# Patient Record
Sex: FEMALE | Race: BLACK OR AFRICAN AMERICAN | NOT HISPANIC OR LATINO | Employment: FULL TIME | URBAN - METROPOLITAN AREA
[De-identification: names, ages, dates, MRNs, and addresses within clinical notes are randomized per-mention and may not be internally consistent; named-entity substitution may affect disease eponyms.]

---

## 2018-06-28 ENCOUNTER — TRANSCRIBE ORDERS (OUTPATIENT)
Dept: ADMINISTRATIVE | Facility: HOSPITAL | Age: 48
End: 2018-06-28

## 2018-06-28 DIAGNOSIS — D50.9 IRON DEFICIENCY ANEMIA, UNSPECIFIED IRON DEFICIENCY ANEMIA TYPE: Primary | ICD-10-CM

## 2018-06-28 DIAGNOSIS — D64.9 LOW HEMOGLOBIN: ICD-10-CM

## 2018-06-29 PROBLEM — D50.9 IRON DEFICIENCY ANEMIA: Status: ACTIVE | Noted: 2018-06-29

## 2018-07-02 ENCOUNTER — HOSPITAL ENCOUNTER (OUTPATIENT)
Dept: INFUSION THERAPY | Facility: HOSPITAL | Age: 48
Discharge: HOME | End: 2018-07-02
Admitting: PHYSICIAN ASSISTANT

## 2018-07-02 VITALS
SYSTOLIC BLOOD PRESSURE: 131 MMHG | DIASTOLIC BLOOD PRESSURE: 81 MMHG | HEART RATE: 76 BPM | WEIGHT: 203 LBS | TEMPERATURE: 98 F | BODY MASS INDEX: 32.62 KG/M2 | OXYGEN SATURATION: 100 % | HEIGHT: 66 IN | RESPIRATION RATE: 16 BRPM

## 2018-07-02 DIAGNOSIS — D50.9 IRON DEFICIENCY ANEMIA, UNSPECIFIED IRON DEFICIENCY ANEMIA TYPE: ICD-10-CM

## 2018-07-02 DIAGNOSIS — D50.8 OTHER IRON DEFICIENCY ANEMIA: Primary | ICD-10-CM

## 2018-07-02 LAB
ABO GROUP BLD: NORMAL
BLD GP AB SCN SERPL QL: NEGATIVE
RH BLD: POSITIVE
T&S EXPIRATION DATE: NORMAL

## 2018-07-02 PROCEDURE — 36415 COLL VENOUS BLD VENIPUNCTURE: CPT

## 2018-07-02 PROCEDURE — P9016 RBC LEUKOCYTES REDUCED: HCPCS

## 2018-07-02 PROCEDURE — 25010000002 FUROSEMIDE PER 20 MG: Performed by: PHYSICIAN ASSISTANT

## 2018-07-02 PROCEDURE — 96374 THER/PROPH/DIAG INJ IV PUSH: CPT

## 2018-07-02 PROCEDURE — 86900 BLOOD TYPING SEROLOGIC ABO: CPT | Performed by: PHYSICIAN ASSISTANT

## 2018-07-02 PROCEDURE — 86901 BLOOD TYPING SEROLOGIC RH(D): CPT | Performed by: PHYSICIAN ASSISTANT

## 2018-07-02 PROCEDURE — 86920 COMPATIBILITY TEST SPIN: CPT

## 2018-07-02 PROCEDURE — 86850 RBC ANTIBODY SCREEN: CPT | Performed by: PHYSICIAN ASSISTANT

## 2018-07-02 PROCEDURE — 36430 TRANSFUSION BLD/BLD COMPNT: CPT

## 2018-07-02 PROCEDURE — 86900 BLOOD TYPING SEROLOGIC ABO: CPT

## 2018-07-02 RX ORDER — FUROSEMIDE 10 MG/ML
20 INJECTION INTRAMUSCULAR; INTRAVENOUS ONCE
Status: CANCELLED
Start: 2018-07-02 | End: 2018-07-02

## 2018-07-02 RX ORDER — FUROSEMIDE 10 MG/ML
20 INJECTION INTRAMUSCULAR; INTRAVENOUS ONCE
Status: COMPLETED | OUTPATIENT
Start: 2018-07-02 | End: 2018-07-02

## 2018-07-02 RX ORDER — TRIAMTERENE AND HYDROCHLOROTHIAZIDE 37.5; 25 MG/1; MG/1
1 CAPSULE ORAL EVERY MORNING
COMMUNITY

## 2018-07-02 RX ORDER — DIPHENHYDRAMINE HCL 25 MG
25 CAPSULE ORAL ONCE AS NEEDED
Status: DISCONTINUED | OUTPATIENT
Start: 2018-07-02 | End: 2018-07-04 | Stop reason: HOSPADM

## 2018-07-02 RX ADMIN — FUROSEMIDE 20 MG: 10 INJECTION, SOLUTION INTRAMUSCULAR; INTRAVENOUS at 12:29

## 2018-07-02 NOTE — PROGRESS NOTES
Tolerated transfusion without difficulty. Up to bathroom prn. Peanut butter crackers and pepsi given. Mother came to visit and brought pt [employer]. Warm blanket given. Rested quietly, watching TV or talking on phone. D/C'd per ambulation @ 1518.

## 2018-07-02 NOTE — PATIENT INSTRUCTIONS
Blood Transfusion, Adult  A blood transfusion is a procedure in which you receive donated blood, including plasma, platelets, and red blood cells, through an IV tube. You may need a blood transfusion because of illness, surgery, or injury. The blood may come from a donor. You may also be able to donate blood for yourself (autologous blood donation) before a surgery if you know that you might require a blood transfusion.  The blood given in a transfusion is made up of different types of cells. You may receive:  · Red blood cells. These carry oxygen to the cells in the body.  · White blood cells. These help you fight infections.  · Platelets. These help your blood to clot.  · Plasma. This is the liquid part of your blood and it helps with fluid imbalances.    If you have hemophilia or another clotting disorder, you may also receive other types of blood products.  Tell a health care provider about:  · Any allergies you have.  · All medicines you are taking, including vitamins, herbs, eye drops, creams, and over-the-counter medicines.  · Any problems you or family members have had with anesthetic medicines.  · Any blood disorders you have.  · Any surgeries you have had.  · Any medical conditions you have, including any recent fever or cold symptoms.  · Whether you are pregnant or may be pregnant.  · Any previous reactions you have had during a blood transfusion.  What are the risks?  Generally, this is a safe procedure. However, problems may occur, including:  · Having an allergic reaction to something in the donated blood. Hives and itching may be symptoms of this type of reaction.  · Fever. This may be a reaction to the white blood cells in the transfused blood. Nausea or chest pain may accompany a fever.  · Iron overload. This can happen from having many transfusions.  · Transfusion-related acute lung injury (TRALI). This is a rare reaction that causes lung damage. The cause is not known. TRALI can occur within hours  of a transfusion or several days later.  · Sudden (acute) or delayed hemolytic reactions. This happens if your blood does not match the cells in your transfusion. Your body’s defense system (immune system) may try to attack the new cells. This complication is rare. The symptoms include fever, chills, nausea, and low back pain or chest pain.  · Infection or disease transmission. This is rare.    What happens before the procedure?  · You will have a blood test to determine your blood type. This is necessary to know what kind of blood your body will accept and to match it to the donor blood.  · If you are going to have a planned surgery, you may be able to do an autologous blood donation. This may be done in case you need to have a transfusion.  · If you have had an allergic reaction to a transfusion in the past, you may be given medicine to help prevent a reaction. This medicine may be given to you by mouth or through an IV tube.  · You will have your temperature, blood pressure, and pulse monitored before the transfusion.  · Follow instructions from your health care provider about eating and drinking restrictions.  · Ask your health care provider about:  ? Changing or stopping your regular medicines. This is especially important if you are taking diabetes medicines or blood thinners.  ? Taking medicines such as aspirin and ibuprofen. These medicines can thin your blood. Do not take these medicines before your procedure if your health care provider instructs you not to.  What happens during the procedure?  · An IV tube will be inserted into one of your veins.  · The bag of donated blood will be attached to your IV tube. The blood will then enter through your vein.  · Your temperature, blood pressure, and pulse will be monitored regularly during the transfusion. This monitoring is done to detect early signs of a transfusion reaction.  · If you have any signs or symptoms of a reaction, your transfusion will be stopped  and you may be given medicine.  · When the transfusion is complete, your IV tube will be removed.  · Pressure may be applied to the IV site for a few minutes.  · A bandage (dressing) will be applied.  The procedure may vary among health care providers and hospitals.  What happens after the procedure?  · Your temperature, blood pressure, heart rate, breathing rate, and blood oxygen level will be monitored often.  · Your blood may be tested to see how you are responding to the transfusion.  · You may be warmed with fluids or blankets to maintain a normal body temperature.  Summary  · A blood transfusion is a procedure in which you receive donated blood, including plasma, platelets, and red blood cells, through an IV tube.  · Your temperature, blood pressure, and pulse will be monitored before, during, and after the transfusion.  · Your blood may be tested after the transfusion to see how your body has responded.  This information is not intended to replace advice given to you by your health care provider. Make sure you discuss any questions you have with your health care provider.  Document Released: 12/15/2001 Document Revised: 09/14/2017 Document Reviewed: 09/14/2017  [employer] Interactive Patient Education © 2018 [employer]

## 2018-07-03 LAB
ABO + RH BLD: NORMAL
ABO + RH BLD: NORMAL
BH BB BLOOD EXPIRATION DATE: NORMAL
BH BB BLOOD EXPIRATION DATE: NORMAL
BH BB BLOOD TYPE BARCODE: 5100
BH BB BLOOD TYPE BARCODE: 5100
BH BB DISPENSE STATUS: NORMAL
BH BB DISPENSE STATUS: NORMAL
BH BB PRODUCT CODE: NORMAL
BH BB PRODUCT CODE: NORMAL
BH BB UNIT NUMBER: NORMAL
BH BB UNIT NUMBER: NORMAL
CROSSMATCH INTERPRETATION: NORMAL
CROSSMATCH INTERPRETATION: NORMAL
UNIT  ABO: NORMAL
UNIT  ABO: NORMAL
UNIT  RH: NORMAL
UNIT  RH: NORMAL

## 2018-07-09 ENCOUNTER — OFFICE VISIT (OUTPATIENT)
Dept: GASTROENTEROLOGY | Facility: CLINIC | Age: 48
End: 2018-07-09

## 2018-07-09 VITALS
HEIGHT: 66 IN | DIASTOLIC BLOOD PRESSURE: 82 MMHG | TEMPERATURE: 98.6 F | BODY MASS INDEX: 32.3 KG/M2 | SYSTOLIC BLOOD PRESSURE: 122 MMHG | WEIGHT: 201 LBS

## 2018-07-09 DIAGNOSIS — D50.9 IRON DEFICIENCY ANEMIA, UNSPECIFIED IRON DEFICIENCY ANEMIA TYPE: Primary | ICD-10-CM

## 2018-07-09 PROCEDURE — 99204 OFFICE O/P NEW MOD 45 MIN: CPT | Performed by: INTERNAL MEDICINE

## 2018-07-09 RX ORDER — SODIUM CHLORIDE, SODIUM LACTATE, POTASSIUM CHLORIDE, CALCIUM CHLORIDE 600; 310; 30; 20 MG/100ML; MG/100ML; MG/100ML; MG/100ML
30 INJECTION, SOLUTION INTRAVENOUS CONTINUOUS
Status: CANCELLED | OUTPATIENT
Start: 2018-07-24

## 2018-07-09 NOTE — PROGRESS NOTES
Chief Complaint   Patient presents with   • Anemia     Hemoglobin: 7.0     Subjective      HPI     Estella Montez is a 47 y.o. female who presents for evaluation of anemia.  Chronic issue, dating back to at least 2012. Recent iron indices by PCP shows low iron and %saturation.  She has required transfusion of PRBCs as recently as 1 week ago.  No prior GI evaluation.  No obvious overt Gi bleeding.  Really quite asymptomatic.  Has tried oral iron in past but has been intolerant.  Normal menstrual periods.      Past Medical History:   Diagnosis Date   • Anemia        Current Outpatient Prescriptions:   •  Multiple Vitamins-Minerals (HAIR SKIN AND NAILS FORMULA PO), Take 1 tablet by mouth 2 (Two) Times a Day., Disp: , Rfl:   •  triamterene-hydrochlorothiazide (DYAZIDE) 37.5-25 MG per capsule, Take 1 capsule by mouth Every Morning., Disp: , Rfl:   No Known Allergies  Social History     Social History   • Marital status:      Spouse name: N/A   • Number of children: N/A   • Years of education: N/A     Occupational History   • Not on file.     Social History Main Topics   • Smoking status: Never Smoker   • Smokeless tobacco: Never Used   • Alcohol use Yes      Comment: occasional   • Drug use: No   • Sexual activity: Not on file     Other Topics Concern   • Not on file     Social History Narrative   • No narrative on file     History reviewed. No pertinent family history.  Review of Systems   Constitutional: Negative.    Gastrointestinal: Negative.    All other systems reviewed and are negative.    Objective   Vitals:    07/09/18 1354   BP: 122/82   Temp: 98.6 °F (37 °C)     Physical Exam   Constitutional: She is oriented to person, place, and time. She appears well-developed and well-nourished.   HENT:   Head: Normocephalic and atraumatic.   Mouth/Throat: Oropharynx is clear and moist.   Eyes: EOM are normal. No scleral icterus.   Neck: Normal range of motion. Neck supple. No thyromegaly present.    Cardiovascular: Normal rate, regular rhythm and normal heart sounds.  Exam reveals no gallop and no friction rub.    No murmur heard.  Pulmonary/Chest: Effort normal and breath sounds normal. She has no wheezes. She has no rales. She exhibits no tenderness.   Abdominal: Soft. Bowel sounds are normal. She exhibits no distension. There is no tenderness. There is no rebound and no guarding. No hernia.   obese   Musculoskeletal: Normal range of motion. She exhibits no edema.   Lymphadenopathy:     She has no cervical adenopathy.   Neurological: She is alert and oriented to person, place, and time.   Skin: Skin is warm and dry.   Psychiatric: She has a normal mood and affect. Judgment and thought content normal.   Vitals reviewed.    Assessment/Plan   Assessment:     1. Iron deficiency anemia, unspecified iron deficiency anemia type      Plan:   Patient certainly warrants endoscopic evaluation for her chronic iron deficiency anemia.  I suspected at the end of the day, however, this is not related to GI blood loss given the chronicity of her issue and lack of GI symptosm.  I am going to go ahead and initiate hematology referral to have this worked up from their perspective and also to consider her for IV iron transfusions given her intolerance to oral iron.        Homar Weeks M.D.  [facility]  [address] [place] [address]  Office: [phone]

## 2018-07-10 ENCOUNTER — CONSULT (OUTPATIENT)
Dept: ONCOLOGY | Facility: CLINIC | Age: 48
End: 2018-07-10

## 2018-07-10 ENCOUNTER — LAB (OUTPATIENT)
Dept: LAB | Facility: HOSPITAL | Age: 48
End: 2018-07-10

## 2018-07-10 VITALS
RESPIRATION RATE: 12 BRPM | SYSTOLIC BLOOD PRESSURE: 116 MMHG | TEMPERATURE: 99 F | DIASTOLIC BLOOD PRESSURE: 82 MMHG | WEIGHT: 201.2 LBS | OXYGEN SATURATION: 100 % | HEART RATE: 78 BPM | BODY MASS INDEX: 32.33 KG/M2 | HEIGHT: 66 IN

## 2018-07-10 DIAGNOSIS — IMO0001 ADVERSE EFFECT OF IRON OR ITS COMPOUND, SEQUELA: ICD-10-CM

## 2018-07-10 DIAGNOSIS — D50.9 IRON DEFICIENCY ANEMIA, UNSPECIFIED IRON DEFICIENCY ANEMIA TYPE: Primary | ICD-10-CM

## 2018-07-10 DIAGNOSIS — D50.8 OTHER IRON DEFICIENCY ANEMIA: Primary | ICD-10-CM

## 2018-07-10 LAB
BASOPHILS # BLD AUTO: 0.06 10*3/MM3 (ref 0–0.1)
BASOPHILS NFR BLD AUTO: 0.9 % (ref 0–1.1)
DEPRECATED RDW RBC AUTO: 52.8 FL (ref 37–49)
EOSINOPHIL # BLD AUTO: 0.31 10*3/MM3 (ref 0–0.36)
EOSINOPHIL NFR BLD AUTO: 4.6 % (ref 1–5)
ERYTHROCYTE [DISTWIDTH] IN BLOOD BY AUTOMATED COUNT: 23.7 % (ref 11.7–14.5)
HCT VFR BLD AUTO: 33.2 % (ref 34–45)
HGB BLD-MCNC: 9.8 G/DL (ref 11.5–14.9)
IMM GRANULOCYTES # BLD: 0.02 10*3/MM3 (ref 0–0.03)
IMM GRANULOCYTES NFR BLD: 0.3 % (ref 0–0.5)
LYMPHOCYTES # BLD AUTO: 2.31 10*3/MM3 (ref 1–3.5)
LYMPHOCYTES NFR BLD AUTO: 34.1 % (ref 20–49)
MCH RBC QN AUTO: 19.2 PG (ref 27–33)
MCHC RBC AUTO-ENTMCNC: 29.5 G/DL (ref 32–35)
MCV RBC AUTO: 65.1 FL (ref 83–97)
MONOCYTES # BLD AUTO: 0.55 10*3/MM3 (ref 0.25–0.8)
MONOCYTES NFR BLD AUTO: 8.1 % (ref 4–12)
NEUTROPHILS # BLD AUTO: 3.53 10*3/MM3 (ref 1.5–7)
NEUTROPHILS NFR BLD AUTO: 52 % (ref 39–75)
NRBC BLD MANUAL-RTO: 0 /100 WBC (ref 0–0)
PLATELET # BLD AUTO: 338 10*3/MM3 (ref 150–375)
PMV BLD AUTO: 8.9 FL (ref 8.9–12.1)
RBC # BLD AUTO: 5.1 10*6/MM3 (ref 3.9–5)
WBC NRBC COR # BLD: 6.78 10*3/MM3 (ref 4–10)

## 2018-07-10 PROCEDURE — 99244 OFF/OP CNSLTJ NEW/EST MOD 40: CPT | Performed by: INTERNAL MEDICINE

## 2018-07-10 RX ORDER — DIPHENHYDRAMINE HCL 25 MG
25 CAPSULE ORAL ONCE
Status: CANCELLED | OUTPATIENT
Start: 2018-07-13

## 2018-07-10 RX ORDER — SODIUM CHLORIDE 9 MG/ML
250 INJECTION, SOLUTION INTRAVENOUS ONCE
Status: CANCELLED | OUTPATIENT
Start: 2018-07-13

## 2018-07-10 RX ORDER — ACETAMINOPHEN 325 MG/1
650 TABLET ORAL ONCE
Status: CANCELLED | OUTPATIENT
Start: 2018-07-13

## 2018-07-10 RX ORDER — DIPHENHYDRAMINE HYDROCHLORIDE 50 MG/ML
25 INJECTION INTRAMUSCULAR; INTRAVENOUS ONCE
Status: CANCELLED | OUTPATIENT
Start: 2018-07-13

## 2018-07-10 RX ORDER — FAMOTIDINE 20 MG/1
20 TABLET, FILM COATED ORAL ONCE
Status: CANCELLED | OUTPATIENT
Start: 2018-07-13

## 2018-07-13 ENCOUNTER — INFUSION (OUTPATIENT)
Dept: ONCOLOGY | Facility: HOSPITAL | Age: 48
End: 2018-07-13

## 2018-07-13 VITALS
WEIGHT: 203.3 LBS | BODY MASS INDEX: 33.07 KG/M2 | TEMPERATURE: 98.6 F | HEART RATE: 89 BPM | SYSTOLIC BLOOD PRESSURE: 123 MMHG | DIASTOLIC BLOOD PRESSURE: 75 MMHG

## 2018-07-13 DIAGNOSIS — D50.9 IRON DEFICIENCY ANEMIA, UNSPECIFIED IRON DEFICIENCY ANEMIA TYPE: ICD-10-CM

## 2018-07-13 DIAGNOSIS — IMO0001 ADVERSE EFFECT OF IRON OR ITS COMPOUND, SEQUELA: Primary | ICD-10-CM

## 2018-07-13 PROCEDURE — 25010000002 IRON SUCROSE PER 1 MG: Performed by: INTERNAL MEDICINE

## 2018-07-13 PROCEDURE — 96365 THER/PROPH/DIAG IV INF INIT: CPT | Performed by: INTERNAL MEDICINE

## 2018-07-13 PROCEDURE — 96375 TX/PRO/DX INJ NEW DRUG ADDON: CPT | Performed by: INTERNAL MEDICINE

## 2018-07-13 PROCEDURE — 96366 THER/PROPH/DIAG IV INF ADDON: CPT | Performed by: INTERNAL MEDICINE

## 2018-07-13 PROCEDURE — 63710000001 DIPHENHYDRAMINE PER 50 MG: Performed by: INTERNAL MEDICINE

## 2018-07-13 RX ORDER — DIPHENHYDRAMINE HCL 25 MG
25 CAPSULE ORAL ONCE
Status: CANCELLED | OUTPATIENT
Start: 2018-07-13

## 2018-07-13 RX ORDER — SODIUM CHLORIDE 9 MG/ML
250 INJECTION, SOLUTION INTRAVENOUS ONCE
Status: CANCELLED | OUTPATIENT
Start: 2018-07-13

## 2018-07-13 RX ORDER — DIPHENHYDRAMINE HCL 25 MG
25 CAPSULE ORAL ONCE
Status: COMPLETED | OUTPATIENT
Start: 2018-07-13 | End: 2018-07-13

## 2018-07-13 RX ORDER — SODIUM CHLORIDE 9 MG/ML
250 INJECTION, SOLUTION INTRAVENOUS ONCE
Status: COMPLETED | OUTPATIENT
Start: 2018-07-13 | End: 2018-07-13

## 2018-07-13 RX ADMIN — IRON SUCROSE 300 MG: 20 INJECTION, SOLUTION INTRAVENOUS at 14:02

## 2018-07-13 RX ADMIN — DIPHENHYDRAMINE HYDROCHLORIDE 25 MG: 25 CAPSULE ORAL at 13:28

## 2018-07-13 RX ADMIN — FAMOTIDINE 20 MG: 10 INJECTION, SOLUTION INTRAVENOUS at 13:28

## 2018-07-13 RX ADMIN — SODIUM CHLORIDE 250 ML: 9 INJECTION, SOLUTION INTRAVENOUS at 13:31

## 2018-07-20 ENCOUNTER — INFUSION (OUTPATIENT)
Dept: ONCOLOGY | Facility: HOSPITAL | Age: 48
End: 2018-07-20

## 2018-07-20 VITALS
OXYGEN SATURATION: 97 % | WEIGHT: 204.8 LBS | BODY MASS INDEX: 33.31 KG/M2 | DIASTOLIC BLOOD PRESSURE: 75 MMHG | HEART RATE: 84 BPM | SYSTOLIC BLOOD PRESSURE: 128 MMHG | TEMPERATURE: 98.2 F | RESPIRATION RATE: 16 BRPM

## 2018-07-20 DIAGNOSIS — D50.9 IRON DEFICIENCY ANEMIA, UNSPECIFIED IRON DEFICIENCY ANEMIA TYPE: ICD-10-CM

## 2018-07-20 DIAGNOSIS — IMO0001 ADVERSE EFFECT OF IRON OR ITS COMPOUND, SEQUELA: Primary | ICD-10-CM

## 2018-07-20 PROCEDURE — 63710000001 DIPHENHYDRAMINE PER 50 MG: Performed by: INTERNAL MEDICINE

## 2018-07-20 PROCEDURE — 96375 TX/PRO/DX INJ NEW DRUG ADDON: CPT | Performed by: NURSE PRACTITIONER

## 2018-07-20 PROCEDURE — 96366 THER/PROPH/DIAG IV INF ADDON: CPT | Performed by: NURSE PRACTITIONER

## 2018-07-20 PROCEDURE — 25010000002 IRON SUCROSE PER 1 MG: Performed by: INTERNAL MEDICINE

## 2018-07-20 PROCEDURE — 96365 THER/PROPH/DIAG IV INF INIT: CPT | Performed by: NURSE PRACTITIONER

## 2018-07-20 RX ORDER — SODIUM CHLORIDE 9 MG/ML
250 INJECTION, SOLUTION INTRAVENOUS ONCE
Status: CANCELLED | OUTPATIENT
Start: 2018-07-20

## 2018-07-20 RX ORDER — SODIUM CHLORIDE 9 MG/ML
250 INJECTION, SOLUTION INTRAVENOUS ONCE
Status: COMPLETED | OUTPATIENT
Start: 2018-07-20 | End: 2018-07-20

## 2018-07-20 RX ORDER — DIPHENHYDRAMINE HCL 25 MG
25 CAPSULE ORAL ONCE
Status: COMPLETED | OUTPATIENT
Start: 2018-07-20 | End: 2018-07-20

## 2018-07-20 RX ORDER — FAMOTIDINE 10 MG/ML
20 INJECTION, SOLUTION INTRAVENOUS ONCE
Status: COMPLETED | OUTPATIENT
Start: 2018-07-20 | End: 2018-07-20

## 2018-07-20 RX ORDER — DIPHENHYDRAMINE HCL 25 MG
25 CAPSULE ORAL ONCE
Status: CANCELLED | OUTPATIENT
Start: 2018-07-20

## 2018-07-20 RX ADMIN — FAMOTIDINE 20 MG: 10 INJECTION, SOLUTION INTRAVENOUS at 13:33

## 2018-07-20 RX ADMIN — SODIUM CHLORIDE 250 ML: 900 INJECTION, SOLUTION INTRAVENOUS at 13:30

## 2018-07-20 RX ADMIN — DIPHENHYDRAMINE HYDROCHLORIDE 25 MG: 25 CAPSULE ORAL at 13:29

## 2018-07-20 RX ADMIN — IRON SUCROSE 300 MG: 20 INJECTION, SOLUTION INTRAVENOUS at 14:06

## 2018-07-24 ENCOUNTER — HOSPITAL ENCOUNTER (OUTPATIENT)
Facility: HOSPITAL | Age: 48
Setting detail: HOSPITAL OUTPATIENT SURGERY
Discharge: HOME | End: 2018-07-24
Attending: INTERNAL MEDICINE | Admitting: INTERNAL MEDICINE

## 2018-07-24 ENCOUNTER — ANESTHESIA EVENT (OUTPATIENT)
Dept: GASTROENTEROLOGY | Facility: HOSPITAL | Age: 48
End: 2018-07-24

## 2018-07-24 ENCOUNTER — ANESTHESIA (OUTPATIENT)
Dept: GASTROENTEROLOGY | Facility: HOSPITAL | Age: 48
End: 2018-07-24

## 2018-07-24 VITALS
WEIGHT: 200 LBS | BODY MASS INDEX: 32.53 KG/M2 | TEMPERATURE: 97.9 F | RESPIRATION RATE: 14 BRPM | HEART RATE: 59 BPM | SYSTOLIC BLOOD PRESSURE: 131 MMHG | DIASTOLIC BLOOD PRESSURE: 88 MMHG | OXYGEN SATURATION: 100 %

## 2018-07-24 DIAGNOSIS — D50.9 IRON DEFICIENCY ANEMIA, UNSPECIFIED IRON DEFICIENCY ANEMIA TYPE: ICD-10-CM

## 2018-07-24 LAB
B-HCG UR QL: NEGATIVE
INTERNAL NEGATIVE CONTROL: NEGATIVE
INTERNAL POSITIVE CONTROL: POSITIVE
Lab: NORMAL

## 2018-07-24 PROCEDURE — 43239 EGD BIOPSY SINGLE/MULTIPLE: CPT | Performed by: INTERNAL MEDICINE

## 2018-07-24 PROCEDURE — 81025 URINE PREGNANCY TEST: CPT | Performed by: INTERNAL MEDICINE

## 2018-07-24 PROCEDURE — 88305 TISSUE EXAM BY PATHOLOGIST: CPT | Performed by: INTERNAL MEDICINE

## 2018-07-24 PROCEDURE — 88312 SPECIAL STAINS GROUP 1: CPT | Performed by: INTERNAL MEDICINE

## 2018-07-24 PROCEDURE — 25010000002 PROPOFOL 10 MG/ML EMULSION: Performed by: ANESTHESIOLOGY

## 2018-07-24 PROCEDURE — 45380 COLONOSCOPY AND BIOPSY: CPT | Performed by: INTERNAL MEDICINE

## 2018-07-24 RX ORDER — PROPOFOL 10 MG/ML
VIAL (ML) INTRAVENOUS AS NEEDED
Status: DISCONTINUED | OUTPATIENT
Start: 2018-07-24 | End: 2018-07-24 | Stop reason: SURG

## 2018-07-24 RX ORDER — SODIUM CHLORIDE, SODIUM LACTATE, POTASSIUM CHLORIDE, CALCIUM CHLORIDE 600; 310; 30; 20 MG/100ML; MG/100ML; MG/100ML; MG/100ML
1000 INJECTION, SOLUTION INTRAVENOUS CONTINUOUS
Status: DISCONTINUED | OUTPATIENT
Start: 2018-07-24 | End: 2018-07-24 | Stop reason: HOSPADM

## 2018-07-24 RX ORDER — LIDOCAINE HYDROCHLORIDE 20 MG/ML
INJECTION, SOLUTION INFILTRATION; PERINEURAL AS NEEDED
Status: DISCONTINUED | OUTPATIENT
Start: 2018-07-24 | End: 2018-07-24 | Stop reason: SURG

## 2018-07-24 RX ORDER — PROPOFOL 10 MG/ML
VIAL (ML) INTRAVENOUS CONTINUOUS PRN
Status: DISCONTINUED | OUTPATIENT
Start: 2018-07-24 | End: 2018-07-24 | Stop reason: SURG

## 2018-07-24 RX ADMIN — SODIUM CHLORIDE, POTASSIUM CHLORIDE, SODIUM LACTATE AND CALCIUM CHLORIDE 1000 ML: 600; 310; 30; 20 INJECTION, SOLUTION INTRAVENOUS at 08:31

## 2018-07-24 RX ADMIN — SODIUM CHLORIDE, POTASSIUM CHLORIDE, SODIUM LACTATE AND CALCIUM CHLORIDE: 600; 310; 30; 20 INJECTION, SOLUTION INTRAVENOUS at 08:58

## 2018-07-24 RX ADMIN — PROPOFOL 200 MG: 10 INJECTION, EMULSION INTRAVENOUS at 09:01

## 2018-07-24 RX ADMIN — PROPOFOL 200 MCG/KG/MIN: 10 INJECTION, EMULSION INTRAVENOUS at 09:04

## 2018-07-24 RX ADMIN — LIDOCAINE HYDROCHLORIDE 50 MG: 20 INJECTION, SOLUTION INFILTRATION; PERINEURAL at 09:01

## 2018-07-24 NOTE — H&P (VIEW-ONLY)
Chief Complaint   Patient presents with   • Anemia     Hemoglobin: 7.0     Subjective      HPI     Estella Montez is a 47 y.o. female who presents for evaluation of anemia.  Chronic issue, dating back to at least 2012. Recent iron indices by PCP shows low iron and %saturation.  She has required transfusion of PRBCs as recently as 1 week ago.  No prior GI evaluation.  No obvious overt Gi bleeding.  Really quite asymptomatic.  Has tried oral iron in past but has been intolerant.  Normal menstrual periods.      Past Medical History:   Diagnosis Date   • Anemia        Current Outpatient Prescriptions:   •  Multiple Vitamins-Minerals (HAIR SKIN AND NAILS FORMULA PO), Take 1 tablet by mouth 2 (Two) Times a Day., Disp: , Rfl:   •  triamterene-hydrochlorothiazide (DYAZIDE) 37.5-25 MG per capsule, Take 1 capsule by mouth Every Morning., Disp: , Rfl:   No Known Allergies  Social History     Social History   • Marital status:      Spouse name: N/A   • Number of children: N/A   • Years of education: N/A     Occupational History   • Not on file.     Social History Main Topics   • Smoking status: Never Smoker   • Smokeless tobacco: Never Used   • Alcohol use Yes      Comment: occasional   • Drug use: No   • Sexual activity: Not on file     Other Topics Concern   • Not on file     Social History Narrative   • No narrative on file     History reviewed. No pertinent family history.  Review of Systems   Constitutional: Negative.    Gastrointestinal: Negative.    All other systems reviewed and are negative.    Objective   Vitals:    07/09/18 1354   BP: 122/82   Temp: 98.6 °F (37 °C)     Physical Exam   Constitutional: She is oriented to person, place, and time. She appears well-developed and well-nourished.   HENT:   Head: Normocephalic and atraumatic.   Mouth/Throat: Oropharynx is clear and moist.   Eyes: EOM are normal. No scleral icterus.   Neck: Normal range of motion. Neck supple. No thyromegaly present.      Cardiovascular: Normal rate, regular rhythm and normal heart sounds.  Exam reveals no gallop and no friction rub.    No murmur heard.  Pulmonary/Chest: Effort normal and breath sounds normal. She has no wheezes. She has no rales. She exhibits no tenderness.   Abdominal: Soft. Bowel sounds are normal. She exhibits no distension. There is no tenderness. There is no rebound and no guarding. No hernia.   obese   Musculoskeletal: Normal range of motion. She exhibits no edema.   Lymphadenopathy:     She has no cervical adenopathy.   Neurological: She is alert and oriented to person, place, and time.   Skin: Skin is warm and dry.   Psychiatric: She has a normal mood and affect. Judgment and thought content normal.   Vitals reviewed.    Assessment/Plan   Assessment:     1. Iron deficiency anemia, unspecified iron deficiency anemia type      Plan:   Patient certainly warrants endoscopic evaluation for her chronic iron deficiency anemia.  I suspected at the end of the day, however, this is not related to GI blood loss given the chronicity of her issue and lack of GI symptosm.  I am going to go ahead and initiate hematology referral to have this worked up from their perspective and also to consider her for IV iron transfusions given her intolerance to oral iron.        Homar Weeks M.D.  [facility]  [address] [place] [address]  Office: [phone]

## 2018-07-24 NOTE — ANESTHESIA POSTPROCEDURE EVALUATION
Patient: Estella Montez    Procedure Summary     Date:  07/24/18 Room / Location:   [facility]    Anesthesia Start:  0858 Anesthesia Stop:  0934    Procedures:       ESOPHAGOGASTRODUODENOSCOPY WITH BIOPSY (N/A Esophagus)      COLONOSCOPY TO CECUM AND TI WITH POLYPECTOMY COLD BIOPSY (N/A ) Diagnosis:       Iron deficiency anemia, unspecified iron deficiency anemia type      (Iron deficiency anemia, unspecified iron deficiency anemia type [D50.9])    Surgeon:  Homar Weeks MD Provider:  Jennifer England MD    Anesthesia Type:  MAC ASA Status:  2          Anesthesia Type: MAC  Last vitals  BP   131/88 (07/24/18 0951)   Temp   36.6 °C (97.9 °F) (07/24/18 0941)   Pulse   59 (07/24/18 0951)   Resp   14 (07/24/18 0818)     SpO2   100 % (07/24/18 0951)     Post Anesthesia Care and Evaluation    Patient location during evaluation: PACU  Patient participation: complete - patient participated  Level of consciousness: awake and alert  Pain management: adequate  Airway patency: patent  Anesthetic complications: No anesthetic complications  PONV Status: none  Cardiovascular status: acceptable  Respiratory status: acceptable  Hydration status: acceptable

## 2018-07-24 NOTE — ANESTHESIA PREPROCEDURE EVALUATION
Anesthesia Evaluation     Patient summary reviewed and Nursing notes reviewed   no history of anesthetic complications:  NPO Solid Status: > 8 hours  NPO Liquid Status: > 2 hours           Airway   Mallampati: II  TM distance: >3 FB  Neck ROM: full  No difficulty expected  Dental - normal exam     Pulmonary - negative pulmonary ROS and normal exam    breath sounds clear to auscultation  Cardiovascular - normal exam    Rhythm: regular  Rate: normal    (+) hypertension 2 medications or greater,       Neuro/Psych- negative ROS  GI/Hepatic/Renal/Endo    (+) obesity,       Musculoskeletal (-) negative ROS    Abdominal    Substance History - negative use     OB/GYN negative ob/gyn ROS         Other - negative ROS                       Anesthesia Plan    ASA 2     MAC     intravenous induction   Anesthetic plan and risks discussed with patient.

## 2018-07-25 LAB
CYTO UR: NORMAL
LAB AP CASE REPORT: NORMAL
PATH REPORT.FINAL DX SPEC: NORMAL
PATH REPORT.GROSS SPEC: NORMAL

## 2018-07-27 ENCOUNTER — INFUSION (OUTPATIENT)
Dept: ONCOLOGY | Facility: HOSPITAL | Age: 48
End: 2018-07-27

## 2018-07-27 ENCOUNTER — TELEPHONE (OUTPATIENT)
Dept: GASTROENTEROLOGY | Facility: CLINIC | Age: 48
End: 2018-07-27

## 2018-07-27 VITALS — HEART RATE: 76 BPM | SYSTOLIC BLOOD PRESSURE: 127 MMHG | DIASTOLIC BLOOD PRESSURE: 80 MMHG

## 2018-07-27 DIAGNOSIS — IMO0001 ADVERSE EFFECT OF IRON OR ITS COMPOUND, SEQUELA: Primary | ICD-10-CM

## 2018-07-27 DIAGNOSIS — A04.8 H. PYLORI INFECTION: Primary | ICD-10-CM

## 2018-07-27 DIAGNOSIS — D50.9 IRON DEFICIENCY ANEMIA, UNSPECIFIED IRON DEFICIENCY ANEMIA TYPE: ICD-10-CM

## 2018-07-27 PROCEDURE — 25010000002 IRON SUCROSE PER 1 MG: Performed by: INTERNAL MEDICINE

## 2018-07-27 PROCEDURE — 96366 THER/PROPH/DIAG IV INF ADDON: CPT | Performed by: INTERNAL MEDICINE

## 2018-07-27 PROCEDURE — 96365 THER/PROPH/DIAG IV INF INIT: CPT | Performed by: INTERNAL MEDICINE

## 2018-07-27 PROCEDURE — 63710000001 DIPHENHYDRAMINE PER 50 MG: Performed by: INTERNAL MEDICINE

## 2018-07-27 PROCEDURE — 96375 TX/PRO/DX INJ NEW DRUG ADDON: CPT | Performed by: INTERNAL MEDICINE

## 2018-07-27 RX ORDER — CLARITHROMYCIN 500 MG/1
500 TABLET, COATED ORAL 2 TIMES DAILY
Qty: 20 TABLET | Refills: 0 | Status: SHIPPED | OUTPATIENT
Start: 2018-07-27 | End: 2018-08-06

## 2018-07-27 RX ORDER — SODIUM CHLORIDE 9 MG/ML
250 INJECTION, SOLUTION INTRAVENOUS ONCE
OUTPATIENT
Start: 2018-07-27

## 2018-07-27 RX ORDER — LANSOPRAZOLE 30 MG/1
30 CAPSULE, DELAYED RELEASE ORAL 2 TIMES DAILY
Qty: 20 CAPSULE | Refills: 0 | Status: SHIPPED | OUTPATIENT
Start: 2018-07-27 | End: 2018-08-06

## 2018-07-27 RX ORDER — DIPHENHYDRAMINE HCL 25 MG
25 CAPSULE ORAL ONCE
Status: COMPLETED | OUTPATIENT
Start: 2018-07-27 | End: 2018-07-27

## 2018-07-27 RX ORDER — DIPHENHYDRAMINE HCL 25 MG
25 CAPSULE ORAL ONCE
OUTPATIENT
Start: 2018-07-27

## 2018-07-27 RX ORDER — SODIUM CHLORIDE 9 MG/ML
250 INJECTION, SOLUTION INTRAVENOUS ONCE
Status: COMPLETED | OUTPATIENT
Start: 2018-07-27 | End: 2018-07-27

## 2018-07-27 RX ORDER — AMOXICILLIN 500 MG/1
1000 CAPSULE ORAL 2 TIMES DAILY
Qty: 40 CAPSULE | Refills: 0 | Status: SHIPPED | OUTPATIENT
Start: 2018-07-27 | End: 2018-08-06

## 2018-07-27 RX ADMIN — SODIUM CHLORIDE 250 ML: 900 INJECTION, SOLUTION INTRAVENOUS at 13:37

## 2018-07-27 RX ADMIN — FAMOTIDINE 20 MG: 10 INJECTION, SOLUTION INTRAVENOUS at 13:37

## 2018-07-27 RX ADMIN — IRON SUCROSE 300 MG: 20 INJECTION, SOLUTION INTRAVENOUS at 13:57

## 2018-07-27 RX ADMIN — DIPHENHYDRAMINE HYDROCHLORIDE 25 MG: 25 CAPSULE ORAL at 13:37

## 2018-07-27 NOTE — TELEPHONE ENCOUNTER
----- Message from Homar Weeks MD sent at 7/27/2018  9:18 AM EDT -----  + H pylori  Prevpac and f/u HPBT per protocol  Office visit after breath test  Colon polyp benign - recall 10yrs

## 2018-07-27 NOTE — TELEPHONE ENCOUNTER
Called pt and advised that her path results showed the polyp removed from her colon was benign or normal tissue and MD recommends to repeat her colonoscopy in 10 years. Advised that the biopsy form her stomach showed a bacteria called H pylori. Explained diagnosis to pt and how/why we treat. Advised will call the scripts into her pharmacy for her and we will have her complete the breath test to verify eradication of the bacteria in 6 weeks. Advised she will follow up with the MD the following week. Pt verb understanding.   Lab appt made for 9/7 at 11 AM.  F/U appt made for 9/12 at 9 AM.    Meds e-scribed.  Lab order placed.     Health Maintenance updated to reflect C/S due 7/2028.

## 2018-07-27 NOTE — PROGRESS NOTES
+ H pylori  Prevpac and f/u HPBT per protocol  Office visit after breath test  Colon polyp benign - recall 10yrs

## 2018-08-17 ENCOUNTER — OFFICE VISIT (OUTPATIENT)
Dept: ONCOLOGY | Facility: CLINIC | Age: 48
End: 2018-08-17

## 2018-08-17 ENCOUNTER — LAB (OUTPATIENT)
Dept: LAB | Facility: HOSPITAL | Age: 48
End: 2018-08-17

## 2018-08-17 VITALS
HEIGHT: 65 IN | SYSTOLIC BLOOD PRESSURE: 110 MMHG | WEIGHT: 203.4 LBS | BODY MASS INDEX: 33.89 KG/M2 | HEART RATE: 77 BPM | DIASTOLIC BLOOD PRESSURE: 70 MMHG | OXYGEN SATURATION: 96 % | TEMPERATURE: 98.2 F | RESPIRATION RATE: 14 BRPM

## 2018-08-17 DIAGNOSIS — D50.9 IRON DEFICIENCY ANEMIA, UNSPECIFIED IRON DEFICIENCY ANEMIA TYPE: Primary | ICD-10-CM

## 2018-08-17 DIAGNOSIS — IMO0001 ADVERSE EFFECT OF IRON OR ITS COMPOUND, SEQUELA: ICD-10-CM

## 2018-08-17 DIAGNOSIS — D50.9 IRON DEFICIENCY ANEMIA, UNSPECIFIED IRON DEFICIENCY ANEMIA TYPE: ICD-10-CM

## 2018-08-17 LAB
BASOPHILS # BLD AUTO: 0.05 10*3/MM3 (ref 0–0.1)
BASOPHILS NFR BLD AUTO: 0.7 % (ref 0–1.1)
EOSINOPHIL # BLD AUTO: 0.23 10*3/MM3 (ref 0–0.36)
EOSINOPHIL NFR BLD AUTO: 3.4 % (ref 1–5)
ERYTHROCYTE [DISTWIDTH] IN BLOOD BY AUTOMATED COUNT: ABNORMAL % (ref 11.7–14.5)
FERRITIN SERPL-MCNC: 114.7 NG/ML (ref 11–207)
HCT VFR BLD AUTO: 37.4 % (ref 34–45)
HGB BLD-MCNC: 11.7 G/DL (ref 11.5–14.9)
IMM GRANULOCYTES # BLD: 0.02 10*3/MM3 (ref 0–0.03)
IMM GRANULOCYTES NFR BLD: 0.3 % (ref 0–0.5)
IRON 24H UR-MRATE: 79 MCG/DL (ref 37–145)
IRON SATN MFR SERPL: 25 % (ref 14–48)
LYMPHOCYTES # BLD AUTO: 2.4 10*3/MM3 (ref 1–3.5)
LYMPHOCYTES NFR BLD AUTO: 35.5 % (ref 20–49)
MCH RBC QN AUTO: 24.2 PG (ref 27–33)
MCHC RBC AUTO-ENTMCNC: 31.3 G/DL (ref 32–35)
MCV RBC AUTO: 77.4 FL (ref 83–97)
MONOCYTES # BLD AUTO: 0.52 10*3/MM3 (ref 0.25–0.8)
MONOCYTES NFR BLD AUTO: 7.7 % (ref 4–12)
NEUTROPHILS # BLD AUTO: 3.55 10*3/MM3 (ref 1.5–7)
NEUTROPHILS NFR BLD AUTO: 52.4 % (ref 39–75)
NRBC BLD MANUAL-RTO: 0 /100 WBC (ref 0–0)
PLATELET # BLD AUTO: 257 10*3/MM3 (ref 150–375)
PMV BLD AUTO: 9.4 FL (ref 8.9–12.1)
RBC # BLD AUTO: 4.83 10*6/MM3 (ref 3.9–5)
TIBC SERPL-MCNC: 316 MCG/DL (ref 249–505)
TRANSFERRIN SERPL-MCNC: 226 MG/DL (ref 200–360)
WBC NRBC COR # BLD: 6.77 10*3/MM3 (ref 4–10)

## 2018-08-17 PROCEDURE — 82728 ASSAY OF FERRITIN: CPT | Performed by: INTERNAL MEDICINE

## 2018-08-17 PROCEDURE — 99213 OFFICE O/P EST LOW 20 MIN: CPT | Performed by: INTERNAL MEDICINE

## 2018-08-17 PROCEDURE — 83540 ASSAY OF IRON: CPT | Performed by: INTERNAL MEDICINE

## 2018-08-17 PROCEDURE — 36415 COLL VENOUS BLD VENIPUNCTURE: CPT | Performed by: INTERNAL MEDICINE

## 2018-08-17 PROCEDURE — 85025 COMPLETE CBC W/AUTO DIFF WBC: CPT | Performed by: INTERNAL MEDICINE

## 2018-08-17 PROCEDURE — 84466 ASSAY OF TRANSFERRIN: CPT | Performed by: INTERNAL MEDICINE

## 2018-08-17 NOTE — PROGRESS NOTES
.     REASON FOR FOLLOWUP:     Iron deficiency anemia, unable to tolerate oral iron because of GI side effects.  Patient was given intravenous Venofer total 900 mg in 2018.      HISTORY OF PRESENT ILLNESS:  The patient is a 47 y.o. year old female here for re-evaluation of severe anemia secondary to significant iron deficiency.     Because side effects of oral iron treatment, patient was given intravenous iron therapy with Venofer weekly for 3 times total 900 mg in 2018.  Tolerated therapy very well.     Today patient reports significant improvement of energy level.  She has improved hemoglobin 11.7, MCV 77.4.  She also has normal WBC and normal platelets.  Further study showed normal ferritin 114.7 ng/mL, iron saturation 25%.      The patient was seen by GI specialist Dr. Weeks who performed both EGD and colonoscopy on 2018.  I reviewed her pathology report, this showed H. pylori positive gastritis.  Patient was treated with antibiotics.        Past Medical History:   Diagnosis Date   • Anemia    • Hypertension     New onset      Past Surgical History:   Procedure Laterality Date   • COLONOSCOPY W/ POLYPECTOMY N/A 2018    Procedure: COLONOSCOPY TO CECUM AND TI WITH POLYPECTOMY COLD BIOPSY;  Surgeon: Homar Weeks MD;  Location: [facility] END[facility];  Service: Gastroenterology   • ENDOSCOPY N/A 2018    Procedure: ESOPHAGOGASTRODUODENOSCOPY WITH BIOPSY;  Surgeon: Homar Weeks MD;  Location: [facility] ENDOSCOPY;  Service: Gastroenterology       GYN history  with 1 miscarriage.  Patient is .  Premenopausal.    HEMATOLOGIC/ONCOLOGIC HISTORY:  The patient is a 47 y.o. year old female here on 7/10/2018 for evaluation of severe anemia secondary to significant iron deficiency. The patient is referred by GI specialist Dr. Weeks. Dr. Weeks saw the patient yesterday for iron deficiency anemia and will have EGD and colonoscopy on 2018.     This patient  recently was found having severe anemia with hemoglobin 7.0 on 06/28/2018 together with severe iron deficiency with ferritin 3 ng/mL, iron 11, TIBC 440 and iron saturation 3%. Her vitamin B12 level was 511 pg/mL and folic acid 6.0 ng/mL. Her hemoglobin was 7.0 and WBC was 5800 including neutrophils 3200, lymphocytes 1900. Her platelets were 374,000, MCV 59 and MCHC 28.1. Chemistry lab reported unremarkable CMP, creatinine 0.81, normal electrolytes, normal liver function panel, serum albumin 4.2 and globulin 3.1. TSH was normal at 2.06.     Because of her severe anemia, the patient was given 2 units PRBC transfusion. The patient reports posttransfusion she feels more energetic, and no further pica for ice chips.    This patient has history of iron deficiency for years. According to the patient, she has always been a little anemic. Laboratory study on 02/14/2012 reported severe iron deficiency with ferritin 3 ng/mL, iron saturation 5%, free iron 23, TIBC 445. Her B12 level was 781 pg/mL and folic acid 15.6 ng/mL. Hemoglobin was 8.8, MCV 70, platelets were 354,000, WBC 5200. Her laboratory study from 03/15/2012 reported ferritin 15 ng/mL, iron saturation 26%, free iron 91 and TIBC 348, B12 level 721 and folic acid 12.5. Hemoglobin was 11.2, MCV 78, MCHC 30.8 and platelets 296,000, WBC 6200.    According to the patient she was previously was taking oral iron supplementation 3 times a day, however she had significant side effects in the GI system with nausea and abdomen cramping.     The patient reports no melena, no hematochezia or any other bleeding. She is premenopausal, continues to have regular menstrual cycles with normal volume. She denies other bleeding problem.     Laboratory study today showed improved hemoglobin 9.8, MCV 65.1 and MCHC 29.5. Platelets 338,000 and WBC 6780 including neutrophils 3500, lymphocytes 2300.    The patient reports no fever, sweating or chills, no weight loss. She actually gains weight.  She has no other complaints, no joint pain, sweating, fevers, cold sensitivity, headaches, syncope.       MEDICATIONS    Current Outpatient Prescriptions:   •  triamterene-hydrochlorothiazide (DYAZIDE) 37.5-25 MG per capsule, Take 1 capsule by mouth Every Morning., Disp: , Rfl:   •  Multiple Vitamins-Minerals (HAIR SKIN AND NAILS FORMULA PO), Take 1 tablet by mouth 2 (Two) Times a Day., Disp: , Rfl:     ALLERGIES:   No Known Allergies    SOCIAL HISTORY:       Social History     Social History   • Marital status:      Spouse name: Demarco   • Number of children: 3    • Years of education: High school education.       Occupational History   • Customer service  [employer]     Social History Main Topics   • Smoking status: Never Smoker   • Smokeless tobacco: Never Used   • Alcohol use Yes      Comment: occasional   • Drug use: No   • Sexual activity: Not on file         FAMILY HISTORY:  Family History   Problem Relation Age of Onset   • Hypertension Mother    • Hypertension Sister    • Breast cancer Sister    A sister was diagnosed of breast cancer at age 27, and passed away because cancer at age of 30.   She denies examination for sickle cell disease or thalassemia.  Her mother has anemia.     REVIEW OF SYSTEMS:  Review of Systems   Constitutional: Positive for fatigue and unexpected weight change. Negative for chills and fever.   HENT: Negative for congestion, nosebleeds, sore throat and trouble swallowing.    Eyes: Negative for visual disturbance.   Respiratory: Negative for cough and shortness of breath.    Cardiovascular: Negative for chest pain, palpitations and leg swelling.   Gastrointestinal: Negative for blood in stool, constipation, nausea and vomiting.   Genitourinary: Negative for dysuria and hematuria.   Musculoskeletal: Negative for arthralgias and joint swelling.   Skin: Negative for color change.   Neurological: Negative for dizziness, syncope and headaches.   Hematological: Negative for adenopathy.  "Does not bruise/bleed easily.   Psychiatric/Behavioral: Negative for confusion.              Vitals:    08/17/18 1250   BP: 110/70   Pulse: 77   Resp: 14   Temp: 98.2 °F (36.8 °C)   SpO2: 96%   Weight: 92.3 kg (203 lb 6.4 oz)   Height: 166 cm (65.35\")  Comment: new ht w/o shoes   PainSc: 0-No pain     Current Status 8/17/2018   ECOG score 0      PHYSICAL EXAM:      CONSTITUTIONAL:  Well-developed well-nourished -American female.  No distress, looks comfortable.  EYES:  Conjunctiva and lids unremarkable.   EARS,NOSE,MOUTH,THROAT:   Lips appear unremarkable.  RESPIRATORY:  Normal respiratory effort.  Lungs clear to auscultation bilaterally.  CARDIOVASCULAR:  Regular rhythm and rate.  Normal S1, S2.  No murmurs.     GASTROINTESTINAL: Abdomen appears unremarkable.  Nontender.  No hepatomegaly.  No splenomegaly.  LYMPHATIC:  No cervical, supraclavicular, axillary lymphadenopathy.  MUSCULOSKELETAL:  Unremarkable gait.  No cyanosis or clubbing.  No significant lower extremity edema.  SKIN:  Warm.  No rashes.  PSYCHIATRIC:  Normal judgment and insight.  Normal mood and affect.      RECENT LABS:    Lab Results   Component Value Date    WBC 6.77 08/17/2018    HGB 11.7 08/17/2018    HCT 37.4 08/17/2018    MCV 77.4 (L) 08/17/2018     08/17/2018     Lab Results   Component Value Date    NEUTROABS 3.55 08/17/2018 06/28/2018  severe iron deficiency with ferritin 3 ng/mL, iron 11, TIBC 440 and iron saturation 3%.    Lab Results   Component Value Date    IRON 79 08/17/2018    TIBC 316 08/17/2018    FERRITIN 114.70 08/17/2018   iron sats 24%.       Assessment/Plan      Iron deficiency anemia, not able to tolerate oral iron treatment.  She was given 3 doses of Venofer total 900 mg in July 2018.  She reports much improved energy level.  Laboratory study also showed much improved hemoglobin, close to normal together with improved MCV value..    Patient had both EGD and colonoscopy by Dr. Weeks, on 7/24/2018.  She " was positive for H. pylori gastritis and treated by Dr. Weeks.     I reviewed her EGD and a colonoscopy procedure notes and also pathology evaluation.    PLAN:  1.  Take oral folic acid 400 µg and vitamin B12 at 1000 µg daily.  She can buy over-the-counter product.  2.  Patient returns in every 12 months for labs for CBC, ferritin and iron profile.   3.  Patient will see M.D. in 12 months for evaluation.           SHRUTHI COWART M.D., Ph.D.    8/17/2018      CC:   MD Keo Cruz Jr., M.D.        Dictated using Dragon Dictation.

## 2018-09-01 ENCOUNTER — RESULTS ENCOUNTER (OUTPATIENT)
Dept: GASTROENTEROLOGY | Facility: CLINIC | Age: 48
End: 2018-09-01

## 2018-09-01 DIAGNOSIS — A04.8 H. PYLORI INFECTION: ICD-10-CM

## 2018-09-07 LAB — UREA BREATH TEST QL: NEGATIVE

## 2018-09-17 NOTE — PROGRESS NOTES
Negative  She will be due for repeat colonoscopy in 10yrs  She cancelled her appt last week. She can f/u in office as needed

## 2018-09-19 ENCOUNTER — TELEPHONE (OUTPATIENT)
Dept: GASTROENTEROLOGY | Facility: CLINIC | Age: 48
End: 2018-09-19

## 2018-09-19 NOTE — TELEPHONE ENCOUNTER
Patient called, advised as per Dr. Weeks's note. She verb understanding and is agreeable to the plan.

## 2018-09-19 NOTE — TELEPHONE ENCOUNTER
----- Message from Homar Weeks MD sent at 9/17/2018 10:25 AM EDT -----  Negative  She will be due for repeat colonoscopy in 10yrs  She cancelled her appt last week. She can f/u in office as needed

## 2019-11-11 ENCOUNTER — LAB (OUTPATIENT)
Dept: LAB | Facility: HOSPITAL | Age: 49
End: 2019-11-11

## 2019-11-11 ENCOUNTER — OFFICE VISIT (OUTPATIENT)
Dept: ONCOLOGY | Facility: CLINIC | Age: 49
End: 2019-11-11

## 2019-11-11 VITALS
TEMPERATURE: 98.5 F | SYSTOLIC BLOOD PRESSURE: 151 MMHG | OXYGEN SATURATION: 16 % | WEIGHT: 201.7 LBS | DIASTOLIC BLOOD PRESSURE: 86 MMHG | HEART RATE: 92 BPM | HEIGHT: 65 IN | BODY MASS INDEX: 33.61 KG/M2 | RESPIRATION RATE: 16 BRPM

## 2019-11-11 DIAGNOSIS — D50.9 IRON DEFICIENCY ANEMIA, UNSPECIFIED IRON DEFICIENCY ANEMIA TYPE: Primary | ICD-10-CM

## 2019-11-11 LAB
BASOPHILS # BLD AUTO: 0.06 10*3/MM3 (ref 0–0.2)
BASOPHILS NFR BLD AUTO: 0.8 % (ref 0–1.5)
DEPRECATED RDW RBC AUTO: 43.4 FL (ref 37–54)
EOSINOPHIL # BLD AUTO: 0.25 10*3/MM3 (ref 0–0.4)
EOSINOPHIL NFR BLD AUTO: 3.3 % (ref 0.3–6.2)
ERYTHROCYTE [DISTWIDTH] IN BLOOD BY AUTOMATED COUNT: 16.5 % (ref 12.3–15.4)
FERRITIN SERPL-MCNC: 5.1 NG/ML (ref 11–207)
HCT VFR BLD AUTO: 32.3 % (ref 34–46.6)
HGB BLD-MCNC: 9.3 G/DL (ref 12–15.9)
IMM GRANULOCYTES # BLD AUTO: 0.03 10*3/MM3 (ref 0–0.05)
IMM GRANULOCYTES NFR BLD AUTO: 0.4 % (ref 0–0.5)
IRON 24H UR-MRATE: 15 MCG/DL (ref 37–145)
IRON SATN MFR SERPL: 3 % (ref 14–48)
LYMPHOCYTES # BLD AUTO: 2.49 10*3/MM3 (ref 0.7–3.1)
LYMPHOCYTES NFR BLD AUTO: 32.9 % (ref 19.6–45.3)
MCH RBC QN AUTO: 21 PG (ref 26.6–33)
MCHC RBC AUTO-ENTMCNC: 28.8 G/DL (ref 31.5–35.7)
MCV RBC AUTO: 73.1 FL (ref 79–97)
MONOCYTES # BLD AUTO: 0.61 10*3/MM3 (ref 0.1–0.9)
MONOCYTES NFR BLD AUTO: 8.1 % (ref 5–12)
NEUTROPHILS # BLD AUTO: 4.13 10*3/MM3 (ref 1.7–7)
NEUTROPHILS NFR BLD AUTO: 54.5 % (ref 42.7–76)
NRBC BLD AUTO-RTO: 0 /100 WBC (ref 0–0.2)
PLATELET # BLD AUTO: 312 10*3/MM3 (ref 140–450)
PMV BLD AUTO: 9.3 FL (ref 6–12)
RBC # BLD AUTO: 4.42 10*6/MM3 (ref 3.77–5.28)
TIBC SERPL-MCNC: 456 MCG/DL (ref 249–505)
TRANSFERRIN SERPL-MCNC: 326 MG/DL (ref 200–360)
WBC NRBC COR # BLD: 7.57 10*3/MM3 (ref 3.4–10.8)

## 2019-11-11 PROCEDURE — 82728 ASSAY OF FERRITIN: CPT

## 2019-11-11 PROCEDURE — 83540 ASSAY OF IRON: CPT

## 2019-11-11 PROCEDURE — G0463 HOSPITAL OUTPT CLINIC VISIT: HCPCS | Performed by: INTERNAL MEDICINE

## 2019-11-11 PROCEDURE — 36415 COLL VENOUS BLD VENIPUNCTURE: CPT

## 2019-11-11 PROCEDURE — 99214 OFFICE O/P EST MOD 30 MIN: CPT | Performed by: INTERNAL MEDICINE

## 2019-11-11 PROCEDURE — 84466 ASSAY OF TRANSFERRIN: CPT

## 2019-11-11 PROCEDURE — 85025 COMPLETE CBC W/AUTO DIFF WBC: CPT

## 2019-11-11 RX ORDER — FERROUS SULFATE 325(65) MG
325 TABLET ORAL
Qty: 270 TABLET | Refills: 3 | Status: SHIPPED | OUTPATIENT
Start: 2019-11-11

## 2019-11-11 NOTE — PROGRESS NOTES
.     REASON FOR FOLLOWUP:     Iron deficiency anemia, unable to tolerate oral iron because of GI side effects.  Patient was given intravenous Venofer total 900 mg in 2018.      HISTORY OF PRESENT ILLNESS:  The patient is a 49 y.o. year old female here for re-evaluation of severe anemia secondary to significant iron deficiency.     Because side effects of oral iron treatment, patient was given intravenous iron therapy with Venofer weekly for 3 times total 900 mg in 2018.  Tolerated therapy very well.     Patient reports recently she feels more fatigued.  She denies bleeding such as melena hematochezia.  Patient reports she is not taking oral iron at this point.  She denies pica for ice chips.    Laboratory study today reported worsening hemoglobin 9.3, MCV 73.1 MCHC 28.8; he also had significant recurrent iron deficiency with ferritin 5.1, free iron 15 TIBC 456 and iron saturation 3%.      Past Medical History:   Diagnosis Date   • Anemia    • Hypertension     New onset      Past Surgical History:   Procedure Laterality Date   • COLONOSCOPY W/ POLYPECTOMY N/A 2018    Procedure: COLONOSCOPY TO CECUM AND TI WITH POLYPECTOMY COLD BIOPSY;  Surgeon: Homar Weeks MD;  Location: [facility] ENDOSCOPY;  Service: Gastroenterology   • ENDOSCOPY N/A 2018    Procedure: ESOPHAGOGASTRODUODENOSCOPY WITH BIOPSY;  Surgeon: Homar Weeks MD;  Location: [facility] ENDOSCOPY;  Service: Gastroenterology       GYN history  with 1 miscarriage.  Patient is .  Premenopausal.    HEMATOLOGIC/ONCOLOGIC HISTORY:  The patient is a 47 y.o. year old female here on 7/10/2018 for evaluation of severe anemia secondary to significant iron deficiency. The patient is referred by GI specialist Dr. Weeks. Dr. Weeks saw the patient yesterday for iron deficiency anemia and will have EGD and colonoscopy on 2018.     This patient recently was found having severe anemia with hemoglobin 7.0 on  06/28/2018 together with severe iron deficiency with ferritin 3 ng/mL, iron 11, TIBC 440 and iron saturation 3%. Her vitamin B12 level was 511 pg/mL and folic acid 6.0 ng/mL. Her hemoglobin was 7.0 and WBC was 5800 including neutrophils 3200, lymphocytes 1900. Her platelets were 374,000, MCV 59 and MCHC 28.1. Chemistry lab reported unremarkable CMP, creatinine 0.81, normal electrolytes, normal liver function panel, serum albumin 4.2 and globulin 3.1. TSH was normal at 2.06.     Because of her severe anemia, the patient was given 2 units PRBC transfusion. The patient reports posttransfusion she feels more energetic, and no further pica for ice chips.    This patient has history of iron deficiency for years. According to the patient, she has always been a little anemic. Laboratory study on 02/14/2012 reported severe iron deficiency with ferritin 3 ng/mL, iron saturation 5%, free iron 23, TIBC 445. Her B12 level was 781 pg/mL and folic acid 15.6 ng/mL. Hemoglobin was 8.8, MCV 70, platelets were 354,000, WBC 5200. Her laboratory study from 03/15/2012 reported ferritin 15 ng/mL, iron saturation 26%, free iron 91 and TIBC 348, B12 level 721 and folic acid 12.5. Hemoglobin was 11.2, MCV 78, MCHC 30.8 and platelets 296,000, WBC 6200.    According to the patient she was previously taking oral iron supplementation 3 times a day, however she had significant side effects in the GI system with nausea and abdomen cramping.     The patient reports no melena, no hematochezia or any other bleeding. She is premenopausal, continues to have regular menstrual cycles with normal volume. She denies other bleeding problem.     Laboratory study on 7/10/2018 showed improved hemoglobin 9.8, MCV 65.1 and MCHC 29.5. Platelets 338,000 and WBC 6780 including neutrophils 3500, lymphocytes 2300.    The patient reports no fever, sweating or chills, no weight loss. She actually gains weight. She has no other complaints, no joint pain, sweating,  fevers, cold sensitivity, headaches, syncope.     Laboratory study on 8/17/2018 reported a normalized iron study including ferritin 114, free iron 79 TIBC 316 and iron saturation 25%.  Hemoglobin improved at 11.7, MCV 77.4.    The patient was seen by GI specialist Dr. Weeks who performed both EGD and colonoscopy on 07/24/2018.  I reviewed her pathology report, this showed H. pylori positive gastritis.  Patient was treated with antibiotics.    MEDICATIONS    Current Outpatient Medications:   •  ferrous sulfate 325 (65 FE) MG tablet, Take 1 tablet by mouth 3 (Three) Times a Day With Meals., Disp: 270 tablet, Rfl: 3  •  Multiple Vitamins-Minerals (HAIR SKIN AND NAILS FORMULA PO), Take 1 tablet by mouth 2 (Two) Times a Day., Disp: , Rfl:   •  triamterene-hydrochlorothiazide (DYAZIDE) 37.5-25 MG per capsule, Take 1 capsule by mouth Every Morning., Disp: , Rfl:     ALLERGIES:   No Known Allergies    SOCIAL HISTORY:       Social History     Social History   • Marital status:      Spouse name: Demarco   • Number of children: 3    • Years of education: High school education.       Occupational History   • Customer service  [employer]     Social History Main Topics   • Smoking status: Never Smoker   • Smokeless tobacco: Never Used   • Alcohol use Yes      Comment: occasional   • Drug use: No   • Sexual activity: Not on file         FAMILY HISTORY:  Family History   Problem Relation Age of Onset   • Hypertension Mother    • Hypertension Sister    • Breast cancer Sister    A sister was diagnosed of breast cancer at age 27, and passed away because cancer at age of 30.   She denies family history for sickle cell disease or thalassemia.  Her mother has anemia.     REVIEW OF SYSTEMS:  Review of Systems   Constitutional: Positive for fatigue. Negative for appetite change, chills, fever and unexpected weight change.   HENT: Negative for congestion, nosebleeds, sore throat and trouble swallowing.    Eyes: Negative for visual  "disturbance.   Respiratory: Negative for cough and shortness of breath.    Cardiovascular: Negative for chest pain, palpitations and leg swelling.   Gastrointestinal: Negative for blood in stool, constipation and nausea.   Endocrine: Negative for cold intolerance.   Genitourinary: Negative for dysuria and hematuria.   Musculoskeletal: Negative for arthralgias and joint swelling.   Skin: Negative for color change and rash.   Allergic/Immunologic: Negative for immunocompromised state.   Neurological: Negative for dizziness, syncope, light-headedness and headaches.   Hematological: Negative for adenopathy. Does not bruise/bleed easily.   Psychiatric/Behavioral: Negative for agitation and confusion.              Vitals:    11/11/19 1543   BP: 151/86   Pulse: 92   Resp: 16   Temp: 98.5 °F (36.9 °C)   SpO2: (!) 16%   Weight: 91.5 kg (201 lb 11.2 oz)   Height: 165 cm (64.96\")  Comment: new ht. without shoes   PainSc: 0-No pain     Current Status 11/11/2019   ECOG score 0      PHYSICAL EXAM:    Physical Exam   Constitutional: She is oriented to person, place, and time. She appears well-developed and well-nourished. No distress.   HENT:   Head: Normocephalic and atraumatic.   Eyes: Conjunctivae are normal. Pupils are equal, round, and reactive to light.   Neck: No tracheal deviation present.   Cardiovascular: Normal rate, regular rhythm and normal heart sounds.  Exam reveals no friction rub.    No murmur heard.  Pulmonary/Chest: Effort normal and breath sounds normal. She has no wheezes.   Abdominal: Soft. Bowel sounds are normal.   Lymphadenopathy:     She has no cervical adenopathy.   Neurological: She is alert and oriented to person, place, and time.   Skin: Skin is warm and dry.   Psychiatric: She has a normal mood and affect. Thought content normal.       RECENT LABS:    Lab Results   Component Value Date    WBC 7.57 11/11/2019    HGB 9.3 (L) 11/11/2019    HCT 32.3 (L) 11/11/2019    MCV 73.1 (L) 11/11/2019     " 11/11/2019     Lab Results   Component Value Date    NEUTROABS 4.13 11/11/2019 06/28/2018  severe iron deficiency with ferritin 3 ng/mL, iron 11, TIBC 440 and iron saturation 3%.    Lab Results   Component Value Date    IRON 15 (L) 11/11/2019    TIBC 456 11/11/2019    FERRITIN 5.10 (L) 11/11/2019   iron sats 3%.       Assessment/Plan      1.  Recurrent iron deficiency anemia.    · Previously was not able to tolerate oral iron treatment.  She was given 3 doses of Venofer total 900 mg in July 2018.  She reports much improved energy level afterwards.    · Laboratory study on 8/17/2018 reported normalized on 9 studies including ferritin 114, iron saturation 25%, free iron 79 TIBC 316, also showed much improved hemoglobin 11.7 together with improved MCV value 77.4.   · Patient had both EGD and colonoscopy by Dr. Weeks, on 7/24/2018.  She was positive for H. pylori gastritis and treated by Dr. Weeks.   · Patient has recurrent iron deficiency with a ferritin 5.1, free iron 15 and iron saturation 3%.  She also has deteriorating hemoglobin 9.3 today.  I discussed with her for IV iron treatment again, however she prefers to try oral iron.  So I prescribed oral ferrous sulfate 325 mg 3 times a day.  I will bring her for quick follow-up in 3 months for reassessment.    PLAN:  1.  Start oral ferrous sulfate 325 mg 3 times a day.  I E scribed to her pharmacy.    2.  Continue oral folic acid 400 µg and vitamin B12 at 1000 µg daily.    3.  Patient returns in every 3 months for labs for CBC, ferritin and iron profile.         SHRUTHI COWART M.D., Ph.D.    11/11/2019        CC:   MD Keo Cruz Jr., M.D.        Dictated using Dragon Dictation.

## 2020-02-03 ENCOUNTER — TELEPHONE (OUTPATIENT)
Dept: ONCOLOGY | Facility: CLINIC | Age: 50
End: 2020-02-03

## 2020-02-03 NOTE — TELEPHONE ENCOUNTER
Patient called she is scheduled tomorrow at 3 for Labs and 340p with Dr. Calabrese, she can make the MD appointment but the lab she cannot arrive until 315/320p due to picking her children up from school. Will this be okay?

## 2020-02-04 ENCOUNTER — OFFICE VISIT (OUTPATIENT)
Dept: ONCOLOGY | Facility: CLINIC | Age: 50
End: 2020-02-04

## 2020-02-04 ENCOUNTER — LAB (OUTPATIENT)
Dept: LAB | Facility: HOSPITAL | Age: 50
End: 2020-02-04

## 2020-02-04 VITALS
DIASTOLIC BLOOD PRESSURE: 76 MMHG | HEIGHT: 65 IN | BODY MASS INDEX: 34.04 KG/M2 | TEMPERATURE: 98.3 F | SYSTOLIC BLOOD PRESSURE: 135 MMHG | RESPIRATION RATE: 16 BRPM | WEIGHT: 204.3 LBS | HEART RATE: 86 BPM | OXYGEN SATURATION: 100 %

## 2020-02-04 DIAGNOSIS — D50.9 IRON DEFICIENCY ANEMIA, UNSPECIFIED IRON DEFICIENCY ANEMIA TYPE: ICD-10-CM

## 2020-02-04 DIAGNOSIS — D50.9 IRON DEFICIENCY ANEMIA, UNSPECIFIED IRON DEFICIENCY ANEMIA TYPE: Primary | ICD-10-CM

## 2020-02-04 LAB
BASOPHILS # BLD AUTO: 0.05 10*3/MM3 (ref 0–0.2)
BASOPHILS NFR BLD AUTO: 0.6 % (ref 0–1.5)
DEPRECATED RDW RBC AUTO: 49.1 FL (ref 37–54)
EOSINOPHIL # BLD AUTO: 0.2 10*3/MM3 (ref 0–0.4)
EOSINOPHIL NFR BLD AUTO: 2.3 % (ref 0.3–6.2)
ERYTHROCYTE [DISTWIDTH] IN BLOOD BY AUTOMATED COUNT: 16.9 % (ref 12.3–15.4)
FERRITIN SERPL-MCNC: 29.5 NG/ML (ref 11–207)
HCT VFR BLD AUTO: 37.9 % (ref 34–46.6)
HGB BLD-MCNC: 12.5 G/DL (ref 12–15.9)
IMM GRANULOCYTES # BLD AUTO: 0.03 10*3/MM3 (ref 0–0.05)
IMM GRANULOCYTES NFR BLD AUTO: 0.3 % (ref 0–0.5)
IRON 24H UR-MRATE: 49 MCG/DL (ref 37–145)
IRON SATN MFR SERPL: 13 % (ref 14–48)
LYMPHOCYTES # BLD AUTO: 2.43 10*3/MM3 (ref 0.7–3.1)
LYMPHOCYTES NFR BLD AUTO: 28.2 % (ref 19.6–45.3)
MCH RBC QN AUTO: 26.7 PG (ref 26.6–33)
MCHC RBC AUTO-ENTMCNC: 33 G/DL (ref 31.5–35.7)
MCV RBC AUTO: 81 FL (ref 79–97)
MONOCYTES # BLD AUTO: 0.57 10*3/MM3 (ref 0.1–0.9)
MONOCYTES NFR BLD AUTO: 6.6 % (ref 5–12)
NEUTROPHILS # BLD AUTO: 5.33 10*3/MM3 (ref 1.7–7)
NEUTROPHILS NFR BLD AUTO: 62 % (ref 42.7–76)
NRBC BLD AUTO-RTO: 0 /100 WBC (ref 0–0.2)
PLATELET # BLD AUTO: 318 10*3/MM3 (ref 140–450)
PMV BLD AUTO: 9.7 FL (ref 6–12)
RBC # BLD AUTO: 4.68 10*6/MM3 (ref 3.77–5.28)
TIBC SERPL-MCNC: 364 MCG/DL (ref 249–505)
TRANSFERRIN SERPL-MCNC: 260 MG/DL (ref 200–360)
WBC NRBC COR # BLD: 8.61 10*3/MM3 (ref 3.4–10.8)

## 2020-02-04 PROCEDURE — 82728 ASSAY OF FERRITIN: CPT

## 2020-02-04 PROCEDURE — 84466 ASSAY OF TRANSFERRIN: CPT

## 2020-02-04 PROCEDURE — 36415 COLL VENOUS BLD VENIPUNCTURE: CPT

## 2020-02-04 PROCEDURE — 83540 ASSAY OF IRON: CPT

## 2020-02-04 PROCEDURE — 85025 COMPLETE CBC W/AUTO DIFF WBC: CPT

## 2020-02-04 PROCEDURE — 99213 OFFICE O/P EST LOW 20 MIN: CPT | Performed by: INTERNAL MEDICINE

## 2020-02-04 NOTE — PROGRESS NOTES
.     REASON FOR FOLLOWUP:     1. Iron deficiency anemia, unable to tolerate oral iron because of GI side effects.  Patient was given intravenous Venofer total 900 mg in 2018.    2. The patient was started on oral ferrous sulfate on 2019.  She continues on the oral ferrous sulfate 325 mg twice daily with good tolerance (could not tolerate 3 times a day).     HISTORY OF PRESENT ILLNESS:  The patient is a 49 y.o. female here for 3-month re-evaluation of significant anemia secondary to severe iron deficiency.  The patient presents by herself today.    We will saw patient in 2019 for recurrent iron deficiency.  Discussed with her at that time, she prefer not to have IV iron therapy instead we will retry oral again.  The patient was started on oral ferrous sulfate 325 mg 3 times a day on 2019.  She reports she has been taking the oral iron 2 times a day, instead of 3 times a day, with good tolerance.  She denies any side effects with taking the oral iron.  Her fatigue has resolved, and she is feeling well at this time.  She denies bleeding such as melena or hematochezia.  She denies pica for ice chips.    She continues to have menstrual cycles.    Laboratory studies today, 2020, show normalized hemoglobin 12.5.  Also has improved on saturation 13% and ferritin 29.5 ng/mL.      Past Medical History:   Diagnosis Date   • Anemia     Iron deficiency   • Hypertension     New onset      Past Surgical History:   Procedure Laterality Date   • COLONOSCOPY W/ POLYPECTOMY N/A 2018    Procedure: COLONOSCOPY TO CECUM AND TI WITH POLYPECTOMY COLD BIOPSY;  Surgeon: Homar Weeks MD;  Location: [facility] ENDOSCOPY;  Service: Gastroenterology   • ENDOSCOPY N/A 2018    Procedure: ESOPHAGOGASTRODUODENOSCOPY WITH BIOPSY;  Surgeon: Homar Weeks MD;  Location: [facility] ENDOSCOPY;  Service: Gastroenterology       GYN history  with 1 miscarriage.  Patient is .   Premenopausal.    HEMATOLOGIC/ONCOLOGIC HISTORY:  The patient is a 47 y.o. year old female here on 7/10/2018 for evaluation of severe anemia secondary to significant iron deficiency. The patient is referred by GI specialist Dr. Weeks. Dr. Weeks saw the patient yesterday for iron deficiency anemia and will have EGD and colonoscopy on 07/24/2018.     This patient recently was found having severe anemia with hemoglobin 7.0 on 06/28/2018 together with severe iron deficiency with ferritin 3 ng/mL, iron 11, TIBC 440 and iron saturation 3%. Her vitamin B12 level was 511 pg/mL and folic acid 6.0 ng/mL. Her hemoglobin was 7.0 and WBC was 5800 including neutrophils 3200, lymphocytes 1900. Her platelets were 374,000, MCV 59 and MCHC 28.1. Chemistry lab reported unremarkable CMP, creatinine 0.81, normal electrolytes, normal liver function panel, serum albumin 4.2 and globulin 3.1. TSH was normal at 2.06.     Because of her severe anemia, the patient was given 2 units PRBC transfusion. The patient reports posttransfusion she feels more energetic, and no further pica for ice chips.    This patient has history of iron deficiency for years. According to the patient, she has always been a little anemic. Laboratory study on 02/14/2012 reported severe iron deficiency with ferritin 3 ng/mL, iron saturation 5%, free iron 23, TIBC 445. Her B12 level was 781 pg/mL and folic acid 15.6 ng/mL. Hemoglobin was 8.8, MCV 70, platelets were 354,000, WBC 5200. Her laboratory study from 03/15/2012 reported ferritin 15 ng/mL, iron saturation 26%, free iron 91 and TIBC 348, B12 level 721 and folic acid 12.5. Hemoglobin was 11.2, MCV 78, MCHC 30.8 and platelets 296,000, WBC 6200.    According to the patient she was previously taking oral iron supplementation 3 times a day, however she had significant side effects in the GI system with nausea and abdomen cramping.     The patient reports no melena, no hematochezia or any other bleeding. She is  premenopausal, continues to have regular menstrual cycles with normal volume. She denies other bleeding problem.     Laboratory study on 7/10/2018 showed improved hemoglobin 9.8, MCV 65.1 and MCHC 29.5. Platelets 338,000 and WBC 6780 including neutrophils 3500, lymphocytes 2300.    The patient reports no fever, sweating or chills, no weight loss. She actually gains weight. She has no other complaints, no joint pain, sweating, fevers, cold sensitivity, headaches, syncope.     Laboratory study on 8/17/2018 reported a normalized iron study including ferritin 114, free iron 79 TIBC 316 and iron saturation 25%.  Hemoglobin improved at 11.7, MCV 77.4.    The patient was seen by GI specialist Dr. Weeks who performed both EGD and colonoscopy on 07/24/2018.  I reviewed her pathology report, this showed H. pylori positive gastritis.  Patient was treated with antibiotics.    Because side effects of oral iron treatment, patient was given intravenous iron therapy with Venofer weekly for 3 times total 900 mg in July 2018.  Tolerated therapy very well.     Laboratory study on 11/11/2019 reported worsening hemoglobin 9.3, MCV 73.1 MCHC 28.8; he also had significant recurrent iron deficiency with ferritin 5.1, free iron 15 TIBC 456 and iron saturation 3%.    MEDICATIONS    Current Outpatient Medications:   •  ferrous sulfate 325 (65 FE) MG tablet, Take 1 tablet by mouth 3 (Three) Times a Day With Meals., Disp: 270 tablet, Rfl: 3  •  Multiple Vitamins-Minerals (HAIR SKIN AND NAILS FORMULA PO), Take 1 tablet by mouth 2 (Two) Times a Day., Disp: , Rfl:   •  triamterene-hydrochlorothiazide (DYAZIDE) 37.5-25 MG per capsule, Take 1 capsule by mouth Every Morning., Disp: , Rfl:     ALLERGIES:   No Known Allergies    SOCIAL HISTORY:       Social History     Socioeconomic History   • Marital status:      Spouse name: Demarco   • Number of children: 3   • Years of education: High school   • Highest education level: Not on file  "  Occupational History   • Occupation: Customer service     Employer: [employer]   Tobacco Use   • Smoking status: Never Smoker   • Smokeless tobacco: Never Used   Substance and Sexual Activity   • Alcohol use: Yes     Comment: occasional   • Drug use: No   • Sexual activity: Defer     FAMILY HISTORY:  Family History   Problem Relation Age of Onset   • Hypertension Mother    • Hypertension Sister    • Breast cancer Sister 27   A sister was diagnosed of breast cancer at age 27, and passed away because cancer at age of 30.   She denies family history for sickle cell disease or thalassemia.  Her mother has anemia.     REVIEW OF SYSTEMS:  Review of Systems   Constitutional: Negative for appetite change, chills, fatigue, fever and unexpected weight change.   HENT: Negative for congestion, nosebleeds, sore throat and trouble swallowing.    Eyes: Negative for visual disturbance.   Respiratory: Negative for cough and shortness of breath.    Cardiovascular: Negative for chest pain, palpitations and leg swelling.   Gastrointestinal: Negative for blood in stool, constipation and nausea.   Endocrine: Negative for cold intolerance.   Genitourinary: Negative for dysuria and hematuria.   Musculoskeletal: Negative for arthralgias and joint swelling.   Skin: Negative for color change and rash.   Allergic/Immunologic: Negative for immunocompromised state.   Neurological: Negative for dizziness, syncope, light-headedness and headaches.   Hematological: Negative for adenopathy. Does not bruise/bleed easily.   Psychiatric/Behavioral: Negative for agitation and confusion.              Vitals:    02/04/20 1546   BP: 135/76   Pulse: 86   Resp: 16   Temp: 98.3 °F (36.8 °C)   SpO2: 100%   Weight: 92.7 kg (204 lb 4.8 oz)   Height: 165 cm (64.96\")   PainSc: 0-No pain     Current Status 2/4/2020   ECOG score 0      PHYSICAL EXAM:    GENERAL:  Well-developed, well-nourished -American female, in no acute distress.   SKIN:  Warm, dry without " rashes, purpura or petechiae.  HEAD:  Normocephalic.  EYES:  Pupils equal, round and reactive to light.  EOMs intact.  Conjunctivae normal.  EARS:  Hearing intact.  NOSE:   No nasal discharge.   MOUTH:  Tongue is well-papillated; no stomatitis or ulcers.  Lips normal.  THROAT:  Oropharynx without lesions or exudates.  NECK:  Supple with good range of motion; no thyromegaly or masses, no JVD.  LYMPHATICS:  No cervical, supraclavicular, axillary or inguinal adenopathy.  CHEST:  Lungs clear to auscultation. Good airflow.  CARDIAC:  Regular rate and rhythm without murmurs, rubs or gallops. Normal S1,S2.  ABDOMEN:  Soft, nontender with no organomegaly or masses.  EXTREMITIES:  No clubbing, cyanosis or edema.  NEUROLOGICAL:  Cranial Nerves II-XII grossly intact.  No focal neurological deficits.  PSYCHIATRIC:  Normal affect and mood.        RECENT LABS:    Lab Results   Component Value Date    WBC 8.61 02/04/2020    HGB 12.5 02/04/2020    HCT 37.9 02/04/2020    MCV 81.0 02/04/2020     02/04/2020     Lab Results   Component Value Date    NEUTROABS 5.33 02/04/2020     On 11/11/2019, patient had recurrent iron deficiency with a ferritin 5.1, free iron 15 and iron saturation 3%.     Lab Results   Component Value Date    IRON 49 02/04/2020    TIBC 364 02/04/2020    FERRITIN 29.50 02/04/2020   Iron saturation is 13% today, 2/4/2020      Assessment/Plan      1.  Recurrent iron deficiency anemia.    · Previously was not able to tolerate oral iron treatment.  She was given 3 doses of Venofer total 900 mg in July 2018.  She reports much improved energy level afterwards.    · Laboratory study on 8/17/2018 reported normalized on 9 studies including ferritin 114, iron saturation 25%, free iron 79 TIBC 316, also showed much improved hemoglobin 11.7 together with improved MCV value 77.4.   · Patient had both EGD and colonoscopy by Dr. Weeks, on 7/24/2018.  She was positive for H. pylori gastritis and treated by Dr. Weeks.    · On 11/11/2019, patient had recurrent iron deficiency with ferritin 5.1, free iron 15 and iron saturation 3%.  She also has deteriorating hemoglobin 9.3.  I discussed with her for IV iron treatment again, however she prefers to try oral iron.  So I prescribed oral ferrous sulfate 325 mg 3 times a day.    · She is now taking oral ferrous sulfate 325 mg 2 times a day with good tolerance.  I discussed with her that she will likely have to remain on the oral iron while she continues to have menstrual cycles.  The patient verbalized her understanding.  · Laboratory studies today, 2/4/2020, show normalized hemoglobin 12.5, and normalized MCV 81.0.  Iron studies showed improved iron saturation 13% and ferritin 29.5 ng/mL, however still having iron deficiency.  We asked patient to continue oral iron treatment.   · I will bring her for follow-up in 6 months for reassessment with repeat CBC, ferritin, and iron profile.    PLAN:  1.  Continue oral ferrous sulfate 325 mg 2 times a day.   2.  Continue oral folic acid 400 µg and vitamin B12 at 1000 µg daily.   3.  Patient returns in 6 months for re-evaluation with labs CBC, ferritin and iron profile.     By signing my name here, I Levon Ho, attest that all documentation on 02/04/20 at 4:41 PM has been prepared under the direction and in the presence of Dr. Shruthi Calabrese MD.    I reviewed the note scribed by Levon Ho and made appropriate corrections.      SHRUTHI CALABRESE M.D., Ph.D.        CC:   MD Keo Cruz Jr., M.D.        Dictated using Dragon Dictation.

## 2020-08-03 ENCOUNTER — OFFICE VISIT (OUTPATIENT)
Dept: ONCOLOGY | Facility: CLINIC | Age: 50
End: 2020-08-03

## 2020-08-03 ENCOUNTER — LAB (OUTPATIENT)
Dept: LAB | Facility: HOSPITAL | Age: 50
End: 2020-08-03

## 2020-08-03 VITALS
OXYGEN SATURATION: 100 % | TEMPERATURE: 97.7 F | SYSTOLIC BLOOD PRESSURE: 138 MMHG | HEART RATE: 89 BPM | DIASTOLIC BLOOD PRESSURE: 83 MMHG | BODY MASS INDEX: 34.57 KG/M2 | WEIGHT: 207.5 LBS

## 2020-08-03 DIAGNOSIS — D50.9 IRON DEFICIENCY ANEMIA, UNSPECIFIED IRON DEFICIENCY ANEMIA TYPE: ICD-10-CM

## 2020-08-03 DIAGNOSIS — D50.9 IRON DEFICIENCY ANEMIA, UNSPECIFIED IRON DEFICIENCY ANEMIA TYPE: Primary | ICD-10-CM

## 2020-08-03 LAB
BASOPHILS # BLD AUTO: 0.07 10*3/MM3 (ref 0–0.2)
BASOPHILS NFR BLD AUTO: 0.9 % (ref 0–1.5)
DEPRECATED RDW RBC AUTO: 46.2 FL (ref 37–54)
EOSINOPHIL # BLD AUTO: 0.23 10*3/MM3 (ref 0–0.4)
EOSINOPHIL NFR BLD AUTO: 2.9 % (ref 0.3–6.2)
ERYTHROCYTE [DISTWIDTH] IN BLOOD BY AUTOMATED COUNT: 15.6 % (ref 12.3–15.4)
FERRITIN SERPL-MCNC: 54.8 NG/ML (ref 11–207)
HCT VFR BLD AUTO: 35.9 % (ref 34–46.6)
HGB BLD-MCNC: 11.4 G/DL (ref 12–15.9)
IMM GRANULOCYTES # BLD AUTO: 0.02 10*3/MM3 (ref 0–0.05)
IMM GRANULOCYTES NFR BLD AUTO: 0.2 % (ref 0–0.5)
IRON 24H UR-MRATE: 188 MCG/DL (ref 37–145)
IRON SATN MFR SERPL: 47 % (ref 14–48)
LYMPHOCYTES # BLD AUTO: 2.51 10*3/MM3 (ref 0.7–3.1)
LYMPHOCYTES NFR BLD AUTO: 31.1 % (ref 19.6–45.3)
MCH RBC QN AUTO: 26.8 PG (ref 26.6–33)
MCHC RBC AUTO-ENTMCNC: 31.8 G/DL (ref 31.5–35.7)
MCV RBC AUTO: 84.3 FL (ref 79–97)
MONOCYTES # BLD AUTO: 0.63 10*3/MM3 (ref 0.1–0.9)
MONOCYTES NFR BLD AUTO: 7.8 % (ref 5–12)
NEUTROPHILS NFR BLD AUTO: 4.61 10*3/MM3 (ref 1.7–7)
NEUTROPHILS NFR BLD AUTO: 57.1 % (ref 42.7–76)
NRBC BLD AUTO-RTO: 0 /100 WBC (ref 0–0.2)
PLATELET # BLD AUTO: 331 10*3/MM3 (ref 140–450)
PMV BLD AUTO: 9.8 FL (ref 6–12)
RBC # BLD AUTO: 4.26 10*6/MM3 (ref 3.77–5.28)
TIBC SERPL-MCNC: 398 MCG/DL (ref 249–505)
TRANSFERRIN SERPL-MCNC: 284 MG/DL (ref 200–360)
WBC # BLD AUTO: 8.07 10*3/MM3 (ref 3.4–10.8)

## 2020-08-03 PROCEDURE — 85025 COMPLETE CBC W/AUTO DIFF WBC: CPT

## 2020-08-03 PROCEDURE — 36415 COLL VENOUS BLD VENIPUNCTURE: CPT

## 2020-08-03 PROCEDURE — 82728 ASSAY OF FERRITIN: CPT

## 2020-08-03 PROCEDURE — 99213 OFFICE O/P EST LOW 20 MIN: CPT | Performed by: INTERNAL MEDICINE

## 2020-08-03 PROCEDURE — 83540 ASSAY OF IRON: CPT

## 2020-08-03 PROCEDURE — 84466 ASSAY OF TRANSFERRIN: CPT

## 2020-08-10 NOTE — PROGRESS NOTES
.     REASON FOR FOLLOWUP:     1. Iron deficiency anemia, unable to tolerate oral iron because of GI side effects.  Patient was given intravenous Venofer total 900 mg in 2018.    2. The patient was started on oral ferrous sulfate on 2019.  She continues on the oral ferrous sulfate 325 mg twice daily with good tolerance (could not tolerate 3 times a day).     HISTORY OF PRESENT ILLNESS:  The patient is a 49 y.o. female here for 6-month re-evaluation of iron deficiency anemia.  The patient presents by herself today.    Patient reports she has been tolerating oral iron twice a day.  She has improved energy level.  She denies fainting syncope.  No nausea no vomiting but occasional constipation.    Laboratory study today showed mild anemia with Hb 11.4, MCV 84.3 MCHC 31.8.  Normal platelets 331,000 and WBC 8070 including ANC 4600 lymphocytes 2500.         Past Medical History:   Diagnosis Date   • Anemia     Iron deficiency   • Hypertension     New onset      Past Surgical History:   Procedure Laterality Date   • COLONOSCOPY W/ POLYPECTOMY N/A 2018    Procedure: COLONOSCOPY TO CECUM AND TI WITH POLYPECTOMY COLD BIOPSY;  Surgeon: Homar Weeks MD;  Location: [facility] ENDOSCOPY;  Service: Gastroenterology   • ENDOSCOPY N/A 2018    Procedure: ESOPHAGOGASTRODUODENOSCOPY WITH BIOPSY;  Surgeon: Homar Weeks MD;  Location: [facility] ENDOSCOPY;  Service: Gastroenterology       GYN history  with 1 miscarriage.  Patient is .  Premenopausal.    HEMATOLOGIC/ONCOLOGIC HISTORY:  The patient is a 47 y.o. year old female here on 7/10/2018 for evaluation of severe anemia secondary to significant iron deficiency. The patient is referred by GI specialist Dr. Weeks. Dr. Weeks saw the patient yesterday for iron deficiency anemia and will have EGD and colonoscopy on 2018.     This patient recently was found having severe anemia with hemoglobin 7.0 on 2018 together with  severe iron deficiency with ferritin 3 ng/mL, iron 11, TIBC 440 and iron saturation 3%. Her vitamin B12 level was 511 pg/mL and folic acid 6.0 ng/mL. Her hemoglobin was 7.0 and WBC was 5800 including neutrophils 3200, lymphocytes 1900. Her platelets were 374,000, MCV 59 and MCHC 28.1. Chemistry lab reported unremarkable CMP, creatinine 0.81, normal electrolytes, normal liver function panel, serum albumin 4.2 and globulin 3.1. TSH was normal at 2.06.     Because of her severe anemia, the patient was given 2 units PRBC transfusion. The patient reports posttransfusion she feels more energetic, and no further pica for ice chips.    This patient has history of iron deficiency for years. According to the patient, she has always been a little anemic. Laboratory study on 02/14/2012 reported severe iron deficiency with ferritin 3 ng/mL, iron saturation 5%, free iron 23, TIBC 445. Her B12 level was 781 pg/mL and folic acid 15.6 ng/mL. Hemoglobin was 8.8, MCV 70, platelets were 354,000, WBC 5200. Her laboratory study from 03/15/2012 reported ferritin 15 ng/mL, iron saturation 26%, free iron 91 and TIBC 348, B12 level 721 and folic acid 12.5. Hemoglobin was 11.2, MCV 78, MCHC 30.8 and platelets 296,000, WBC 6200.    According to the patient she was previously taking oral iron supplementation 3 times a day, however she had significant side effects in the GI system with nausea and abdomen cramping.     The patient reports no melena, no hematochezia or any other bleeding. She is premenopausal, continues to have regular menstrual cycles with normal volume. She denies other bleeding problem.     Laboratory study on 7/10/2018 showed improved hemoglobin 9.8, MCV 65.1 and MCHC 29.5. Platelets 338,000 and WBC 6780 including neutrophils 3500, lymphocytes 2300.    The patient reports no fever, sweating or chills, no weight loss. She actually gains weight. She has no other complaints, no joint pain, sweating, fevers, cold sensitivity,  headaches, syncope.     Laboratory study on 8/17/2018 reported a normalized iron study including ferritin 114, free iron 79 TIBC 316 and iron saturation 25%.  Hemoglobin improved at 11.7, MCV 77.4.    The patient was seen by GI specialist Dr. Weeks who performed both EGD and colonoscopy on 07/24/2018.  I reviewed her pathology report, this showed H. pylori positive gastritis.  Patient was treated with antibiotics.    Because side effects of oral iron treatment, patient was given intravenous iron therapy with Venofer weekly for 3 times total 900 mg in July 2018.  Tolerated therapy very well.     Laboratory study on 11/11/2019 reported worsening hemoglobin 9.3, MCV 73.1 MCHC 28.8; he also had significant recurrent iron deficiency with ferritin 5.1, free iron 15 TIBC 456 and iron saturation 3%.    We saw patient in November 2019 for recurrent iron deficiency.  Discussed with her at that time, she prefer not to have IV iron therapy instead we will retry oral again.  The patient was started on oral ferrous sulfate 325 mg 3 times a day on 11/11/2019.  She reports she has been taking the oral iron 2 times a day, instead of 3 times a day, with good tolerance.  She denies any side effects with taking the oral iron.  Her fatigue has resolved, and she is feeling well at this time.  She denies bleeding such as melena or hematochezia.  She denies pica for ice chips.    She continues to have menstrual cycles.    Laboratory studies, 2/4/2020, show normalized hemoglobin 12.5.  Also has improved on saturation 13% and ferritin 29.5 ng/mL.        MEDICATIONS    Current Outpatient Medications:   •  ferrous sulfate 325 (65 FE) MG tablet, Take 1 tablet by mouth 3 (Three) Times a Day With Meals., Disp: 270 tablet, Rfl: 3  •  Multiple Vitamins-Minerals (HAIR SKIN AND NAILS FORMULA PO), Take 1 tablet by mouth 2 (Two) Times a Day., Disp: , Rfl:   •  triamterene-hydrochlorothiazide (DYAZIDE) 37.5-25 MG per capsule, Take 1 capsule by mouth  Every Morning., Disp: , Rfl:     ALLERGIES:   No Known Allergies    SOCIAL HISTORY:       Social History     Socioeconomic History   • Marital status:      Spouse name: Demarco   • Number of children: 3   • Years of education: High school   • Highest education level: Not on file   Occupational History   • Occupation: Customer service     Employer: [employer]   Tobacco Use   • Smoking status: Never Smoker   • Smokeless tobacco: Never Used   Substance and Sexual Activity   • Alcohol use: Yes     Comment: occasional   • Drug use: No   • Sexual activity: Defer     FAMILY HISTORY:  Family History   Problem Relation Age of Onset   • Hypertension Mother    • Hypertension Sister    • Breast cancer Sister 27   A sister was diagnosed of breast cancer at age 27, and passed away because cancer at age of 30.   She denies family history for sickle cell disease or thalassemia.  Her mother has anemia.     REVIEW OF SYSTEMS:  Review of Systems   Constitutional: Negative for activity change, appetite change, chills, fatigue, fever and unexpected weight change.   HENT: Negative for mouth sores, nosebleeds and sore throat.    Eyes: Negative for visual disturbance.   Respiratory: Negative for cough and shortness of breath.    Cardiovascular: Negative for chest pain, palpitations and leg swelling.   Gastrointestinal: Negative for blood in stool, constipation and nausea.   Endocrine: Negative for cold intolerance and heat intolerance.   Genitourinary: Negative for dysuria and hematuria.   Musculoskeletal: Negative for arthralgias and joint swelling.   Skin: Negative for color change and pallor.   Allergic/Immunologic: Negative for immunocompromised state.   Neurological: Negative for dizziness, light-headedness and headaches.   Hematological: Negative for adenopathy. Does not bruise/bleed easily.   Psychiatric/Behavioral: Negative for agitation and confusion.              Vitals:    08/03/20 1643   BP: 138/83   Pulse: 89   Temp: 97.7 °F  (36.5 °C)   SpO2: 100%   Weight: 94.1 kg (207 lb 8 oz)   PainSc: 0-No pain     Current Status 8/3/2020   ECOG score 0      PHYSICAL EXAM:    GENERAL:  Well-developed, well-nourished female in no acute distress.   SKIN:  Warm, dry without rashes, purpura or petechiae.  EYES:  Pupils equal, round.  EOMs intact.  Conjunctivae normal.  EARS:  Hearing intact.  NOSE:  Patient wears mask due to the pandemic coronavirus infection.   MOUTH: Same as above.  THROAT: Same as above.  NECK:  Supple, no thyromegaly or masses.  LYMPHATICS:  No cervical, supraclavicular, axillary adenopathy.  CHEST: Normal respiratory effort.  Lungs clear to auscultation. Good airflow.  CARDIAC:  Regular rate and rhythm without murmurs, rubs or gallops. Normal S1,S2.  ABDOMEN:  Soft, no tender, no hepatosplenomegaly or masses. Bowel sounds normal.  EXTREMITIES:  No clubbing, cyanosis or edema.  NEUROLOGICAL:  Cranial Nerves II-XII grossly intact.  No focal neurological deficits.  PSYCHIATRIC:  Normal affect and mood.        RECENT LABS:    Lab Results   Component Value Date    WBC 8.07 08/03/2020    HGB 11.4 (L) 08/03/2020    HCT 35.9 08/03/2020    MCV 84.3 08/03/2020     08/03/2020     Lab Results   Component Value Date    NEUTROABS 4.61 08/03/2020       Lab Results   Component Value Date    IRON 188 (H) 08/03/2020    TIBC 398 08/03/2020    FERRITIN 54.80 08/03/2020   Iron saturation is 47% on 8/3/2020       Assessment/Plan      1.  Recurrent iron deficiency anemia.    · Previously was not able to tolerate oral iron treatment.  She was given 3 doses of Venofer total 900 mg in July 2018.  She reports much improved energy level afterwards.    · Laboratory study on 8/17/2018 reported normalized on 9 studies including ferritin 114, iron saturation 25%, free iron 79 TIBC 316, also showed much improved hemoglobin 11.7 together with improved MCV value 77.4.   · Patient had both EGD and colonoscopy by Dr. Weeks, on 7/24/2018.  She was positive  for H. pylori gastritis and treated by Dr. Weeks.   · On 11/11/2019, patient had recurrent iron deficiency with ferritin 5.1, free iron 15 and iron saturation 3%.  She also has deteriorating hemoglobin 9.3.  I discussed with her for IV iron treatment again, however she prefers to try oral iron.  So I prescribed oral ferrous sulfate 325 mg 3 times a day.    · She is now taking oral ferrous sulfate 325 mg 2 times a day with good tolerance.  I discussed with her that she will likely have to remain on the oral iron while she continues to have menstrual cycles.  The patient verbalized her understanding.  · Laboratory studies, 2/4/2020, show normalized hemoglobin 12.5, and normalized MCV 81.0.  Iron studies showed improved iron saturation 13% and ferritin 29.5 ng/mL, however still having iron deficiency.  We asked patient to continue oral iron treatment.   · On 8/3/2020, patient is mild anemia Hb 11.5.  Nevertheless iron studies showed much improved iron saturation 47% and reasonable ferritin 54.8.  Discussed with patient, she will continue oral iron supplementation.  States she is now much improved, she could follow-up with her primary care physician Dr. Emanuel.    PLAN:  1.  Continue oral ferrous sulfate 325 mg 2 times a day.   2.  Continue oral folic acid 400 µg and vitamin B12 at 1000 µg daily to help with erythropoiesis  3.  There is no need for patient to follow-up with us routinely.  She will follow-up with Dr. Emanuel her primary care physician.      Patient voiced understanding and agreeable.        SHRUTHI COWART M.D., Ph.D.        CC:   MD Keo Cruz Jr., M.D.      Dictated using Dragon Dictation.

## 2021-03-30 ENCOUNTER — BULK ORDERING (OUTPATIENT)
Dept: CASE MANAGEMENT | Facility: OTHER | Age: 51
End: 2021-03-30

## 2021-03-30 DIAGNOSIS — Z23 IMMUNIZATION DUE: ICD-10-CM

## 2022-05-17 ENCOUNTER — APPOINTMENT (OUTPATIENT)
Dept: WOMENS IMAGING | Facility: HOSPITAL | Age: 52
End: 2022-05-17

## 2022-05-17 PROCEDURE — 77063 BREAST TOMOSYNTHESIS BI: CPT | Performed by: RADIOLOGY

## 2022-05-17 PROCEDURE — 77067 SCR MAMMO BI INCL CAD: CPT | Performed by: RADIOLOGY

## 2022-07-12 ENCOUNTER — OFFICE VISIT (OUTPATIENT)
Dept: GASTROENTEROLOGY | Facility: CLINIC | Age: 52
End: 2022-07-12

## 2022-07-12 VITALS
TEMPERATURE: 97.7 F | BODY MASS INDEX: 31.69 KG/M2 | WEIGHT: 197.2 LBS | SYSTOLIC BLOOD PRESSURE: 159 MMHG | HEIGHT: 66 IN | DIASTOLIC BLOOD PRESSURE: 90 MMHG

## 2022-07-12 DIAGNOSIS — D50.9 IRON DEFICIENCY ANEMIA, UNSPECIFIED IRON DEFICIENCY ANEMIA TYPE: Primary | ICD-10-CM

## 2022-07-12 PROCEDURE — 99204 OFFICE O/P NEW MOD 45 MIN: CPT | Performed by: INTERNAL MEDICINE

## 2022-07-12 NOTE — PROGRESS NOTES
Chief Complaint   Patient presents with   • iron deficiency anemia     Subjective   HPI  Estella Montez is a 51 y.o. female who presents today for new patient evaluation.  She has a longstanding history of intermittent anemia secondary to iron deficiency.  She underwent evaluation including EGD and colonoscopy in 2018 which were relatively unremarkable.  She did have H. pylori which was treated with confirmation of eradication.  Her colonoscopy was normal.  She was seen by hematology treated with IV iron infusions and eventually had normalization of her hemoglobin.  She has been off of iron for some time now and in May had a hemoglobin of 7.  MCV is low at 65.  She has no GI complaints.  She denies any overt GI bleeding.  She does report she still has regular menstrual periods first 2 to 3 days of her cycle are usually fairly heavy.  She has not been referred to hematology or restarted iron therapy.      Objective   Vitals:    07/12/22 1314   BP: 159/90   Temp: 97.7 °F (36.5 °C)     Physical Exam  Vitals reviewed.   Constitutional:       Appearance: She is well-developed.   HENT:      Head: Normocephalic and atraumatic.   Neurological:      Mental Status: She is alert and oriented to person, place, and time.   Psychiatric:         Behavior: Behavior normal.         Thought Content: Thought content normal.         Judgment: Judgment normal.              Assessment & Plan   Assessment:     1. Iron deficiency anemia, unspecified iron deficiency anemia type      Plan:   51-year-old lady who previously underwent work-up for longstanding iron deficiency in 2018 which was relatively unremarkable.  She presents again with recurrent anemia.  She has not been on iron therapy for unclear reasons.  We will update her CBC and iron profile today.  I would recommend she have referral to hematology to consider restarting regular iron transfusions.  We will check a fecal occult blood x3 if FOBT is negative not sure that she  requires repeat GI evaluation given her prior normal evaluation and the longstanding intermittent nature of her anemia.  If FOBT is positive, we will update EGD/colonoscopy then consider VCE if negative.                Homar Weeks M.D.  [facility]  [address] [place] [address]  Office: [phone]

## 2022-07-13 ENCOUNTER — TELEPHONE (OUTPATIENT)
Dept: GASTROENTEROLOGY | Facility: CLINIC | Age: 52
End: 2022-07-13

## 2022-07-13 LAB
BASOPHILS # BLD AUTO: 0.1 X10E3/UL (ref 0–0.2)
BASOPHILS NFR BLD AUTO: 1 %
EOSINOPHIL # BLD AUTO: 0.2 X10E3/UL (ref 0–0.4)
EOSINOPHIL NFR BLD AUTO: 4 %
ERYTHROCYTE [DISTWIDTH] IN BLOOD BY AUTOMATED COUNT: 23.2 % (ref 11.7–15.4)
FERRITIN SERPL-MCNC: 5 NG/ML (ref 15–150)
HCT VFR BLD AUTO: 30.9 % (ref 34–46.6)
HGB BLD-MCNC: 9.7 G/DL (ref 11.1–15.9)
IMM GRANULOCYTES # BLD AUTO: 0 X10E3/UL (ref 0–0.1)
IMM GRANULOCYTES NFR BLD AUTO: 0 %
IRON SATN MFR SERPL: 7 % (ref 15–55)
IRON SERPL-MCNC: 29 UG/DL (ref 27–159)
LYMPHOCYTES # BLD AUTO: 1.6 X10E3/UL (ref 0.7–3.1)
LYMPHOCYTES NFR BLD AUTO: 33 %
MCH RBC QN AUTO: 24.2 PG (ref 26.6–33)
MCHC RBC AUTO-ENTMCNC: 31.4 G/DL (ref 31.5–35.7)
MCV RBC AUTO: 77 FL (ref 79–97)
MONOCYTES # BLD AUTO: 0.4 X10E3/UL (ref 0.1–0.9)
MONOCYTES NFR BLD AUTO: 9 %
MORPHOLOGY BLD-IMP: ABNORMAL
NEUTROPHILS # BLD AUTO: 2.4 X10E3/UL (ref 1.4–7)
NEUTROPHILS NFR BLD AUTO: 53 %
PLATELET # BLD AUTO: 278 X10E3/UL (ref 150–450)
RBC # BLD AUTO: 4.01 X10E6/UL (ref 3.77–5.28)
TIBC SERPL-MCNC: 399 UG/DL (ref 250–450)
UIBC SERPL-MCNC: 370 UG/DL (ref 131–425)
WBC # BLD AUTO: 4.7 X10E3/UL (ref 3.4–10.8)

## 2022-07-13 NOTE — PROGRESS NOTES
Low iron levels  Recommend Iron sulfate 325mg BID until seen by hematology for IV iron  Awaiting stool cards

## 2022-07-13 NOTE — TELEPHONE ENCOUNTER
----- Message from Homar Weeks MD sent at 7/13/2022  8:36 AM EDT -----  Low iron levels  Recommend Iron sulfate 325mg BID until seen by hematology for IV iron  Awaiting stool cards

## 2022-09-07 ENCOUNTER — TELEPHONE (OUTPATIENT)
Dept: GASTROENTEROLOGY | Facility: CLINIC | Age: 52
End: 2022-09-07

## 2022-09-07 NOTE — TELEPHONE ENCOUNTER
Called and LVM asking the patient to call the office.   We need to see if the patient is still planning on completing the stool study that was ordered July 2022.

## 2022-09-20 ENCOUNTER — TELEPHONE (OUTPATIENT)
Dept: GASTROENTEROLOGY | Facility: CLINIC | Age: 52
End: 2022-09-20

## 2023-05-25 ENCOUNTER — APPOINTMENT (OUTPATIENT)
Dept: WOMENS IMAGING | Facility: HOSPITAL | Age: 53
End: 2023-05-25
Payer: COMMERCIAL

## 2023-05-25 PROCEDURE — 77067 SCR MAMMO BI INCL CAD: CPT | Performed by: RADIOLOGY

## 2023-05-25 PROCEDURE — 77063 BREAST TOMOSYNTHESIS BI: CPT | Performed by: RADIOLOGY

## 2023-09-11 NOTE — PROGRESS NOTES
.     REASON FOR CONSULTATION:     Provide an opinion on any further workup or treatment                             REQUESTING PHYSICIAN: Homar Weeks MD     RECORDS OBTAINED:  Records of the patients history including those obtained from the referring provider were reviewed and summarized in detail.    HISTORY OF PRESENT ILLNESS:  The patient is a 47 y.o. year old female here for evaluation of severe anemia secondary to significant iron deficiency. The patient is referred by GI specialist Dr. Weeks. Dr. Weeks saw the patient yesterday for iron deficiency anemia and will have EGD and colonoscopy on 07/24/2018.     This patient recently was found having severe anemia with hemoglobin 7.0 on 06/28/2018 together with severe iron deficiency with ferritin 3 ng/mL, iron 11, TIBC 440 and iron saturation 3%. Her vitamin B12 level was 511 pg/mL and folic acid 6.0 ng/mL. Her hemoglobin was 7.0 and WBC was 5800 including neutrophils 3200, lymphocytes 1900. Her platelets were 374,000, MCV 59 and MCHC 28.1. Chemistry lab reported unremarkable CMP, creatinine 0.81, normal electrolytes, normal liver function panel, serum albumin 4.2 and globulin 3.1. TSH was normal at 2.06.     Because of her severe anemia, the patient was given 2 units PRBC transfusion. The patient reports posttransfusion she feels more energetic, and no further pica for ice chips.    This patient has history of iron deficiency for years. According to the patient, she has always been a little anemic. Laboratory study on 02/14/2012 reported severe iron deficiency with ferritin 3 ng/mL, iron saturation 5%, free iron 23, TIBC 445. Her B12 level was 781 pg/mL and folic acid 15.6 ng/mL. Hemoglobin was 8.8, MCV 70, platelets were 354,000, WBC 5200. Her laboratory study from 03/15/2012 reported ferritin 15 ng/mL, iron saturation 26%, free iron 91 and TIBC 348, B12 level 721 and folic acid 12.5. Hemoglobin was 11.2, MCV 78, MCHC 30.8 and platelets  296,000, WBC 6200.    According to the patient she was previously was taking oral iron supplementation 3 times a day, however she had significant side effects in the GI system with nausea and abdomen cramping.     The patient reports no melena, no hematochezia or any other bleeding. She is premenopausal, continues to have regular menstrual cycles with normal volume. She denies other bleeding problem.     Laboratory study today showed improved hemoglobin 9.8, MCV 65.1 and MCHC 29.5. Platelets 338,000 and WBC 6780 including neutrophils 3500, lymphocytes 2300.    The patient reports no fever, sweating or chills, no weight loss. She actually gains weight. She has no other complaints, no joint pain, sweating, fevers, cold sensitivity, headaches, syncope.         Past Medical History:   Diagnosis Date   • Anemia    • Hypertension     New onset      No past surgical history on file.    GYN history  with 1 miscarriage.  Patient is .  Premenopausal.    HEMATOLOGIC/ONCOLOGIC HISTORY:  (History from previous dates can be found in the separate document.)    MEDICATIONS    Current Outpatient Prescriptions:   •  Multiple Vitamins-Minerals (HAIR SKIN AND NAILS FORMULA PO), Take 1 tablet by mouth 2 (Two) Times a Day., Disp: , Rfl:   •  triamterene-hydrochlorothiazide (DYAZIDE) 37.5-25 MG per capsule, Take 1 capsule by mouth Every Morning., Disp: , Rfl:     ALLERGIES:   No Known Allergies    SOCIAL HISTORY:       Social History     Social History   • Marital status:      Spouse name: Demarco   • Number of children: 3    • Years of education: High school education.       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   She denies examination for sickle cell disease or thalassemia.  Her mother has anemia.     REVIEW OF SYSTEMS:  Review of Systems   Constitutional: Positive for fatigue and unexpected weight change. Negative for chills and fever.   HENT: Negative for congestion, nosebleeds, sore throat and trouble swallowing.    Eyes: Negative for visual disturbance.   Respiratory: Negative for cough and shortness of breath.    Cardiovascular: Negative for chest pain, palpitations and leg swelling.   Gastrointestinal: Negative for blood in stool, constipation, nausea and vomiting.   Genitourinary: Negative for dysuria and hematuria.   Musculoskeletal: Negative for arthralgias and joint swelling.   Skin: Negative for color change.   Neurological: Negative for dizziness, syncope and headaches.   Hematological: Negative for adenopathy. Does not bruise/bleed easily.   Psychiatric/Behavioral: Negative for confusion.              Vitals:    07/10/18 0837   BP: 116/82   Pulse: 78   Resp: 12   Temp: 99 °F (37.2 °C)   TempSrc: Oral   SpO2: 100%   Weight: 91.3 kg (201 lb 3.2 oz)   Height: 167 cm (65.75\")  Comment: new height with out shoes on   PainSc: 0-No pain     Current Status 7/10/2018   ECOG score 0      PHYSICAL EXAM:      CONSTITUTIONAL:  Well-developed well-nourished -American female.  No distress, looks comfortable.  EYES:  Conjunctiva and lids unremarkable.  PERRLA  EARS,NOSE,MOUTH,THROAT:  Ears and nose appear unremarkable.  Lips appear unremarkable.  RESPIRATORY:  Normal respiratory effort.  Lungs clear to auscultation bilaterally.  CARDIOVASCULAR:  Regular rhythm and rate.  Normal S1, S2.  No murmurs rubs or gallops.     GASTROINTESTINAL: Abdomen appears unremarkable.  Nontender.  No hepatomegaly.  No splenomegaly.  LYMPHATIC:  No cervical, supraclavicular, axillary lymphadenopathy.  MUSCULOSKELETAL:  " Unremarkable gait.  No cyanosis or clubbing.  No significant lower extremity edema.  SKIN:  Warm.  No rashes.  PSYCHIATRIC:  Normal judgment and insight.  Normal mood and affect.      RECENT LABS:    Lab Results   Component Value Date    WBC 6.78 07/10/2018    HGB 9.8 (L) 07/10/2018    HCT 33.2 (L) 07/10/2018    MCV 65.1 (L) 07/10/2018     07/10/2018     Lab Results   Component Value Date    NEUTROABS 3.53 07/10/2018     06/28/2018  severe iron deficiency with ferritin 3 ng/mL, iron 11, TIBC 440 and iron saturation 3%.    Peripheral blood smear reviewed.  There are cigar-shaped RBCs.  Occasional targeted cells.  No schistocytes no sickle cells.  Morphologies of WBC and platelets are normal.      Assessment/Plan      Iron deficiency anemia, unspecified iron deficiency anemia type.  This patient has long history of iron deficiency, was not able to tolerate oral iron supplementation because significant GI side effects.  She denies melena hematochezia.  Patient will have both EGD and a colonoscopy by Dr. Elias on 7/24/2018.     I discussed with the patient for intravenous iron therapy.  She is concerned about the possibility of allergic reaction.  I have proposed with weekly Venofer 300 mg for 3 doses.      I will bring patient back for reevaluation in about 4 weeks, we'll repeat her posttreatment ferritin and iron profile together with CBC.  We'll also follow up with her EGD and colonoscopy results.    I also instructed patient to buy over-the-counter B12 and folic acid take once a day to help with erythropoiesis.  Both of her B12 and folic acid level on the low side although within normal range.     PLAN:  1.  Venofer 300 mg weekly for 3 times.   2.  Keep appointment for both EGD and colonoscopy by Dr. Weeks.   3.  Take oral folic acid 400 µg and vitamin B12 at 1000 µg daily.  She can buy over-the-counter product.  4.  Patient returning in 4 weeks for M.D. reevaluation, we will check CBC, ferritin and iron  profile.     SHRUTHI COWART M.D., Ph.D.    7/10/2018      CC:   MD Keo Cruz Jr., M.D.        Dictated using Dragon Dictation.                   all other ROS negative except as per HPI

## 2024-05-31 ENCOUNTER — APPOINTMENT (OUTPATIENT)
Dept: WOMENS IMAGING | Facility: HOSPITAL | Age: 54
End: 2024-05-31
Payer: COMMERCIAL

## 2024-05-31 PROCEDURE — 77063 BREAST TOMOSYNTHESIS BI: CPT | Performed by: RADIOLOGY

## 2024-05-31 PROCEDURE — 77067 SCR MAMMO BI INCL CAD: CPT | Performed by: RADIOLOGY

## 2025-06-03 ENCOUNTER — APPOINTMENT (OUTPATIENT)
Dept: WOMENS IMAGING | Facility: HOSPITAL | Age: 55
End: 2025-06-03
Payer: COMMERCIAL

## 2025-06-03 PROCEDURE — 77067 SCR MAMMO BI INCL CAD: CPT | Performed by: RADIOLOGY

## 2025-06-03 PROCEDURE — 77063 BREAST TOMOSYNTHESIS BI: CPT | Performed by: RADIOLOGY

## (undated) DEVICE — FRCP BX RADJAW4 NDL 2.8 240CM LG OG BX40

## (undated) DEVICE — THE TORRENT IRRIGATION SCOPE CONNECTOR IS USED WITH THE TORRENT IRRIGATION TUBING TO PROVIDE IRRIGATION FLUIDS SUCH AS STERILE WATER DURING GASTROINTESTINAL ENDOSCOPIC PROCEDURES WHEN USED IN CONJUNCTION WITH AN IRRIGATION PUMP (OR ELECTROSURGICAL UNIT).: Brand: TORRENT

## (undated) DEVICE — CANN NASL CO2 TRULINK W/O2 A/

## (undated) DEVICE — TBG 02 CRUSH RESIST LF CLR 7FT

## (undated) DEVICE — BITEBLOCK OMNI BLOC

## (undated) DEVICE — Device: Brand: DEFENDO AIR/WATER/SUCTION AND BIOPSY VALVE

## (undated) DEVICE — TUBING, SUCTION, 1/4" X 10', STRAIGHT: Brand: MEDLINE